# Patient Record
Sex: FEMALE | Race: WHITE | NOT HISPANIC OR LATINO | Employment: OTHER | URBAN - METROPOLITAN AREA
[De-identification: names, ages, dates, MRNs, and addresses within clinical notes are randomized per-mention and may not be internally consistent; named-entity substitution may affect disease eponyms.]

---

## 2020-02-19 ENCOUNTER — TELEPHONE (OUTPATIENT)
Dept: SCHEDULING | Facility: IMAGING CENTER | Age: 60
End: 2020-02-19

## 2020-02-20 ENCOUNTER — OFFICE VISIT (OUTPATIENT)
Dept: URGENT CARE | Facility: CLINIC | Age: 60
End: 2020-02-20
Payer: COMMERCIAL

## 2020-02-20 VITALS
WEIGHT: 221 LBS | HEIGHT: 67 IN | HEART RATE: 74 BPM | RESPIRATION RATE: 16 BRPM | OXYGEN SATURATION: 95 % | BODY MASS INDEX: 34.69 KG/M2 | DIASTOLIC BLOOD PRESSURE: 84 MMHG | TEMPERATURE: 98 F | SYSTOLIC BLOOD PRESSURE: 118 MMHG

## 2020-02-20 DIAGNOSIS — J40 BRONCHITIS: ICD-10-CM

## 2020-02-20 DIAGNOSIS — Z00.00 HEALTHCARE MAINTENANCE: ICD-10-CM

## 2020-02-20 DIAGNOSIS — R05.9 COUGH: ICD-10-CM

## 2020-02-20 PROCEDURE — 99204 OFFICE O/P NEW MOD 45 MIN: CPT | Performed by: NURSE PRACTITIONER

## 2020-02-20 RX ORDER — ALBUTEROL SULFATE 90 UG/1
2 AEROSOL, METERED RESPIRATORY (INHALATION) EVERY 4 HOURS PRN
Qty: 1 INHALER | Refills: 0 | Status: SHIPPED | OUTPATIENT
Start: 2020-02-20 | End: 2020-03-05

## 2020-02-20 RX ORDER — PREDNISONE 20 MG/1
40 TABLET ORAL DAILY
Qty: 10 TAB | Refills: 0 | Status: SHIPPED | OUTPATIENT
Start: 2020-02-20 | End: 2020-02-25

## 2020-02-20 RX ORDER — DEXTROMETHORPHAN HYDROBROMIDE AND PROMETHAZINE HYDROCHLORIDE 15; 6.25 MG/5ML; MG/5ML
5 SYRUP ORAL EVERY 4 HOURS PRN
Qty: 100 ML | Refills: 0 | Status: SHIPPED | OUTPATIENT
Start: 2020-02-20 | End: 2020-02-27

## 2020-02-20 ASSESSMENT — ENCOUNTER SYMPTOMS
ABDOMINAL PAIN: 0
NAUSEA: 0
COUGH: 1
SORE THROAT: 0
BLURRED VISION: 0
PALPITATIONS: 0
VOMITING: 0
DOUBLE VISION: 0
FEVER: 0
MYALGIAS: 0
DIARRHEA: 0
SPUTUM PRODUCTION: 1
SHORTNESS OF BREATH: 0
SINUS PAIN: 0
HEADACHES: 0
CHILLS: 0
NECK PAIN: 0

## 2020-02-20 ASSESSMENT — COPD QUESTIONNAIRES: COPD: 0

## 2020-02-21 NOTE — PROGRESS NOTES
Subjective:     Gabriela Mosquera is a 59 y.o. female who presents for Cough (x 5 days, little productive cough and wheezing)      Cough   This is a new problem. The current episode started in the past 7 days (4 days ago). The problem has been unchanged. The problem occurs constantly. The cough is non-productive. Associated symptoms include nasal congestion. Pertinent negatives include no chest pain, chills, ear pain, fever, headaches, myalgias, sore throat or shortness of breath. Nothing aggravates the symptoms. She has tried nothing for the symptoms. There is no history of asthma, bronchitis, COPD, emphysema or pneumonia.         Review of Systems   Constitutional: Positive for malaise/fatigue. Negative for chills and fever.   HENT: Positive for congestion. Negative for ear pain, sinus pain and sore throat.         Sore throat has resolved.   Eyes: Negative for blurred vision and double vision.   Respiratory: Positive for cough and sputum production. Negative for shortness of breath.    Cardiovascular: Negative for chest pain and palpitations.   Gastrointestinal: Negative for abdominal pain, diarrhea, nausea and vomiting.   Genitourinary: Negative for dysuria and urgency.   Musculoskeletal: Negative for myalgias and neck pain.   Neurological: Negative for headaches.   All other systems reviewed and are negative.      PMH: History reviewed. No pertinent past medical history.  ALLERGIES: No Known Allergies  SURGHX: History reviewed. No pertinent surgical history.  SOCHX:   Social History     Socioeconomic History   • Marital status:      Spouse name: Not on file   • Number of children: Not on file   • Years of education: Not on file   • Highest education level: Not on file   Occupational History   • Not on file   Social Needs   • Financial resource strain: Not on file   • Food insecurity     Worry: Not on file     Inability: Not on file   • Transportation needs     Medical: Not on file     Non-medical: Not on  "file   Tobacco Use   • Smoking status: Never Smoker   • Smokeless tobacco: Never Used   Substance and Sexual Activity   • Alcohol use: Not on file   • Drug use: Not on file   • Sexual activity: Not on file   Lifestyle   • Physical activity     Days per week: Not on file     Minutes per session: Not on file   • Stress: Not on file   Relationships   • Social connections     Talks on phone: Not on file     Gets together: Not on file     Attends [employer]ism service: Not on file     Active member of club or organization: Not on file     Attends meetings of clubs or organizations: Not on file     Relationship status: Not on file   • Intimate partner violence     Fear of current or ex partner: Not on file     Emotionally abused: Not on file     Physically abused: Not on file     Forced sexual activity: Not on file   Other Topics Concern   • Not on file   Social History Narrative   • Not on file     FH: History reviewed. No pertinent family history.      Objective:   /84 (BP Location: Left arm, Patient Position: Sitting, BP Cuff Size: Adult)   Pulse 74   Temp 36.7 °C (98 °F) (Temporal)   Resp 16   Ht 1.689 m (5' 6.5\")   Wt 100.2 kg (221 lb)   SpO2 95%   BMI 35.14 kg/m²     Physical Exam  Vitals signs and nursing note reviewed.   Constitutional:       General: She is not in acute distress.     Appearance: She is ill-appearing.   HENT:      Head: Normocephalic and atraumatic.      Right Ear: Tympanic membrane normal.      Left Ear: Tympanic membrane normal.      Nose: Congestion and rhinorrhea present.      Mouth/Throat:      Mouth: Mucous membranes are moist.      Pharynx: Posterior oropharyngeal erythema present. No oropharyngeal exudate.      Comments: Positive for hoarse voice  Eyes:      Extraocular Movements: Extraocular movements intact.      Conjunctiva/sclera: Conjunctivae normal.      Pupils: Pupils are equal, round, and reactive to light.   Neck:      Musculoskeletal: No neck rigidity or muscular " tenderness.   Cardiovascular:      Rate and Rhythm: Normal rate and regular rhythm.      Pulses: Normal pulses.      Heart sounds: Normal heart sounds.   Pulmonary:      Effort: Pulmonary effort is normal.      Breath sounds: Decreased air movement present. Examination of the right-lower field reveals decreased breath sounds. Examination of the left-lower field reveals decreased breath sounds. Decreased breath sounds present.   Chest:      Chest wall: No tenderness.   Abdominal:      General: Abdomen is flat. There is no distension.      Palpations: Abdomen is soft.      Tenderness: There is no abdominal tenderness.   Musculoskeletal: Normal range of motion.   Lymphadenopathy:      Cervical: Cervical adenopathy present.   Skin:     General: Skin is warm and dry.      Capillary Refill: Capillary refill takes less than 2 seconds.   Neurological:      General: No focal deficit present.      Mental Status: She is alert and oriented to person, place, and time. Mental status is at baseline.   Psychiatric:         Mood and Affect: Mood normal.         Behavior: Behavior normal.         Judgment: Judgment normal.         Assessment/Plan:   Assessment    1. Cough  promethazine-dextromethorphan (PROMETHAZINE-DM) 6.25-15 MG/5ML syrup    albuterol 108 (90 Base) MCG/ACT Aero Soln inhalation aerosol   2. Bronchitis  promethazine-dextromethorphan (PROMETHAZINE-DM) 6.25-15 MG/5ML syrup    albuterol 108 (90 Base) MCG/ACT Aero Soln inhalation aerosol    predniSONE (DELTASONE) 20 MG Tab   3. Healthcare maintenance  REFERRAL TO FOLLOW-UP WITH PRIMARY CARE    REFERRAL TO DERMATOLOGY     We discussed supportive measures including humidifier, warm salt water gargles, over-the-counter Cepacol throat lozenges, rest  and increased fluids.  She was encouraged to seek treatment back in the ER or urgent care for worsening symptoms,  fever greater than 100.5, wheezes or shortness of breath.    AVS handout given and reviewed with patient. Pt  educated on red flags and when to seek treatment back in ER or UC.

## 2020-02-21 NOTE — PATIENT INSTRUCTIONS
Acute Bronchitis, Adult  Acute bronchitis is when air tubes (bronchi) in the lungs suddenly get swollen. The condition can make it hard to breathe. It can also cause these symptoms:  · A cough.  · Coughing up clear, yellow, or green mucus.  · Wheezing.  · Chest congestion.  · Shortness of breath.  · A fever.  · Body aches.  · Chills.  · A sore throat.  Follow these instructions at home:  Medicines  · Take over-the-counter and prescription medicines only as told by your doctor.  · If you were prescribed an antibiotic medicine, take it as told by your doctor. Do not stop taking the antibiotic even if you start to feel better.  General instructions  · Rest.  · Drink enough fluids to keep your pee (urine) clear or pale yellow.  · Avoid smoking and secondhand smoke. If you smoke and you need help quitting, ask your doctor. Quitting will help your lungs heal faster.  · Use an inhaler, cool mist vaporizer, or humidifier as told by your doctor.  · Keep all follow-up visits as told by your doctor. This is important.  How is this prevented?  To lower your risk of getting this condition again:  · Wash your hands often with soap and water. If you cannot use soap and water, use hand .  · Avoid contact with people who have cold symptoms.  · Try not to touch your hands to your mouth, nose, or eyes.  · Make sure to get the flu shot every year.  Contact a doctor if:  · Your symptoms do not get better in 2 weeks.  Get help right away if:  · You cough up blood.  · You have chest pain.  · You have very bad shortness of breath.  · You become dehydrated.  · You faint (pass out) or keep feeling like you are going to pass out.  · You keep throwing up (vomiting).  · You have a very bad headache.  · Your fever or chills gets worse.  This information is not intended to replace advice given to you by your health care provider. Make sure you discuss any questions you have with your health care provider.  Document Released: 06/05/2009  Document Revised: 07/26/2017 Document Reviewed: 06/07/2017  [employer] Interactive Patient Education © 2017 [employer]

## 2020-03-20 ENCOUNTER — TELEPHONE (OUTPATIENT)
Dept: HEALTH INFORMATION MANAGEMENT | Facility: OTHER | Age: 60
End: 2020-03-20

## 2020-03-20 NOTE — TELEPHONE ENCOUNTER
1. Caller Name:Gabriela Boynton                          Call Back Number: [phone]    [facility] PCP or Specialty Provider: No            2.  Does patient have any active symptoms of respiratory illness (fever OR cough OR shortness of breath)? Yes, the patient reports the following respiratory symptoms: cough. Dx'd w/ bronchitis 3 weeks ago was on steriods and inhaler for    3.  Does patient have any comoribidities? None     4.  In the last 30 days, has the patient traveled outside of the country OR in a high risk area within the [place] OR have any known contact with someone who has or is suspected to have COVID-19?  Yes, [place]    5. Disposition: Cleared by RN Triage; OK to keep/schedule appointment    Note routed to PCP: [facility] only.

## 2020-03-21 ENCOUNTER — HOSPITAL ENCOUNTER (EMERGENCY)
Facility: MEDICAL CENTER | Age: 60
End: 2020-03-21
Attending: EMERGENCY MEDICINE
Payer: COMMERCIAL

## 2020-03-21 ENCOUNTER — APPOINTMENT (OUTPATIENT)
Dept: RADIOLOGY | Facility: MEDICAL CENTER | Age: 60
End: 2020-03-21
Attending: EMERGENCY MEDICINE
Payer: COMMERCIAL

## 2020-03-21 VITALS
RESPIRATION RATE: 19 BRPM | HEART RATE: 61 BPM | WEIGHT: 210 LBS | SYSTOLIC BLOOD PRESSURE: 160 MMHG | OXYGEN SATURATION: 96 % | BODY MASS INDEX: 33.75 KG/M2 | HEIGHT: 66 IN | DIASTOLIC BLOOD PRESSURE: 79 MMHG | TEMPERATURE: 97.8 F

## 2020-03-21 DIAGNOSIS — S82.851A CLOSED TRIMALLEOLAR FRACTURE OF RIGHT ANKLE, INITIAL ENCOUNTER: ICD-10-CM

## 2020-03-21 PROCEDURE — 96375 TX/PRO/DX INJ NEW DRUG ADDON: CPT

## 2020-03-21 PROCEDURE — 96374 THER/PROPH/DIAG INJ IV PUSH: CPT

## 2020-03-21 PROCEDURE — 73610 X-RAY EXAM OF ANKLE: CPT | Mod: RT

## 2020-03-21 PROCEDURE — 27840 TREAT ANKLE DISLOCATION: CPT

## 2020-03-21 PROCEDURE — 99285 EMERGENCY DEPT VISIT HI MDM: CPT

## 2020-03-21 PROCEDURE — 700111 HCHG RX REV CODE 636 W/ 250 OVERRIDE (IP): Performed by: EMERGENCY MEDICINE

## 2020-03-21 PROCEDURE — 73600 X-RAY EXAM OF ANKLE: CPT | Mod: RT

## 2020-03-21 PROCEDURE — 302874 HCHG BANDAGE ACE 2 OR 3""

## 2020-03-21 RX ORDER — HYDROCODONE BITARTRATE AND ACETAMINOPHEN 5; 325 MG/1; MG/1
1-2 TABLET ORAL EVERY 4 HOURS PRN
Qty: 15 TAB | Refills: 0 | Status: SHIPPED | OUTPATIENT
Start: 2020-03-21 | End: 2020-03-24

## 2020-03-21 RX ORDER — MORPHINE SULFATE 4 MG/ML
4 INJECTION, SOLUTION INTRAMUSCULAR; INTRAVENOUS ONCE
Status: COMPLETED | OUTPATIENT
Start: 2020-03-21 | End: 2020-03-21

## 2020-03-21 RX ADMIN — FENTANYL CITRATE 50 MCG: 50 INJECTION INTRAMUSCULAR; INTRAVENOUS at 14:35

## 2020-03-21 RX ADMIN — MORPHINE SULFATE 4 MG: 4 INJECTION INTRAVENOUS at 12:00

## 2020-03-21 RX ADMIN — PROPOFOL 90 MG: 10 INJECTION, EMULSION INTRAVENOUS at 13:20

## 2020-03-21 NOTE — ED NOTES
Pt discharged to home. Pt was given follow up instructions and prescriptions for norco. Pt verbalized understanding of all instructions for discharge and is wheeled out of the ED with her  driving home.

## 2020-03-21 NOTE — ED TRIAGE NOTES
58 y/o female bib ambulance after she was hiking and rolled her ankle while stepping on mud. + swelling/pain to the right ankle, cms intact. Pt was given 50 mcg fentanyl pta.

## 2020-03-21 NOTE — ED PROVIDER NOTES
"      ED Provider Note    Scribed for Merced Dai M.D. by Lizzy Tapia. 3/21/2020, 11:37 AM.    Primary Care Provider: Pcp Not In Computer  Means of arrival: EMS  History obtained from: Patient  History limited by: None    CHIEF COMPLAINT  Chief Complaint   Patient presents with   • T-5000 Ankle Injury       HPI  Gabriela Mosquera is a 59 y.o. female who presents to the Emergency Department for ankle injury that occurred prior to arrival.  notes patient was on a hiking rail when she slipped on some mud and rolled her right ankle. She is currently complaining of right ankle pain. Patient is unable to walk or put weight on her foot secondary to pain. She notes she heard a crack when she fell. No alleviating or exacerbating factors noted. She denies any left knee pain or left leg numbness.     REVIEW OF SYSTEMS  Pertinent positives include left ankle pain. Pertinent negatives include no knee pain or left leg numbness. All other systems negative.     PAST MEDICAL HISTORY   has a past medical history of Bell's palsy.    SOCIAL HISTORY  Social History     Tobacco Use   • Smoking status: Never Smoker   • Smokeless tobacco: Never Used   Substance Use Topics   • Alcohol use: Yes     Comment: daily   • Drug use: Never      Social History     Substance and Sexual Activity   Drug Use Never       SURGICAL HISTORY  patient denies any surgical history     CURRENT MEDICATIONS  Home Medications     Reviewed by Jose Daniel Mcfarland R.N. (Registered Nurse) on 03/21/20 at 1115  Med List Status: <None>   Medication Last Dose Status        Patient Alok Taking any Medications                       ALLERGIES  No Known Allergies    PHYSICAL EXAM  VITAL SIGNS: /96   Pulse 97   Temp 36.6 °C (97.8 °F) (Temporal)   Resp 18   Ht 1.676 m (5' 6\")   Wt 95.3 kg (210 lb)   SpO2 96%   BMI 33.89 kg/m²   Constitutional: Alert in no apparent distress. Well apearing  HENT: Normocephalic, Atraumatic, Bilateral external ears normal. " Nose normal.   Eyes:  Conjunctiva normal, non-icteric.   Lungs: Lungs clear to ascultation. Non-labored respirations.   Cardiovascular: Heart regular rate and rhythm.   Skin: Warm, Dry, No erythema, No rash.   Neurologic: Alert, Grossly non-focal.   Psychiatric: Affect normal, Judgment normal, Mood normal, Appears appropriate and not intoxicated.   Extremities: Swollen right ankle with deformity, intact DP pulses and intact capillary refill of the toes.       RADIOLOGY  DX-ANKLE 3+ VIEWS RIGHT   Final Result      Again seen displaced trimalleolar fracture. Alignment however does appear somewhat improved.      DX-ANKLE 3+ VIEWS RIGHT   Final Result      1.  Displaced fractures of the medial and lateral malleolus with anterior and lateral subluxation of the talus with respect to the tibia.      2.  Multiple bone fragments within the surrounding soft tissues.      3.  Soft tissue swelling.      DX-ANKLE 2- VIEWS RIGHT    (Results Pending)     The radiologist's interpretation of all radiological studies have been reviewed by me.    Conscious Sedation Procedure Note    Indication: Ankle dislocation and fracture dislocation    Consent: I have discussed with the patient and/or the patient representative the indication, alternatives, and the possible risks and/or complications of the planned procedure and the anesthesia methods. The patient and/or patient representative appear to understand and agree to proceed.    Physician Involvement: The attending physician was present and supervising this procedure.    Pre-Sedation Documentation and Exam: I have personally completed a history, physical exam & review of systems for this patient (see notes).    Airway Assessment: normal    Prior History of Anesthesia Complications: none    ASA Classification: Class 1 - A normal healthy patient    Sedation/ Anesthesia Plan: intravenous sedation    Medications Used: propofol intravenously    Monitoring and Safety: The patient was placed on  a cardiac monitor and vital signs, pulse oximetry and level of consciousness were continuously evaluated throughout the procedure. The patient was closely monitored until recovery from the medications was complete and the patient had returned to baseline status. Respiratory therapy was on standby at all times during the procedure.      (The following sections must be completed)  Post-Sedation Vital Signs: Vital signs were reviewed and were stable after the procedure (see flow sheet for vitals)            Intraservice Time: 10 minutes    Post-Sedation Exam: Lungs: clear and Cardiovascular: normal           Complications: none    I provided both the sedation and procedure, a nurse was present at the bedside for the entire procedure.       Joint Reduction Procedure Note    Indication: fracture    Consent: The patient provided verbal consent for this procedure.    Procedure: The pre-reduction exam showed distal perfusion & neurologic function to be normal. The patient was placed in the appropriate position. Anesthesia/pain controlled with conscious sedation. Reduction of the right ankle was performed by direct traction. Post reduction films were obtained and revealed satisfactory reduction. A post-reduction exam revealed distal perfusion & neurologic function to be normal. The affected area was immobilized with a posterior ankle and sugar tong splint and crutches were provided for ambulatory support.    The patient tolerated the procedure well.    Complications: None      COURSE & MEDICAL DECISION MAKING  Pertinent Labs & Imaging studies reviewed. (See chart for details)    11:37 AM - Patient seen and examined at bedside. Patient will be treated with morphine 4 mg/mL. Ordered DX-ankle to evaluate her symptoms. Reviewed x-ray at bedside and explained images to patient. Informed her that she definitely has an ankle fracture, however, it is unclear whether or not she will need surgical intervention. Informed her that I  will consult with orthopedics. She is comfortable with this plan.     12:10 PM Paged orthopedics.      12:18 PM - I discussed the patient's case and the above findings with Dr. Damon (Orthopedics) who recommended splinting patient's ankle and having her follow up outpatient.     12:19 PM - Patient reevaluated at bedside. Discussed conversation with orthopedics with patient and informed her that they recommended she be splinted and follow up out patient. Given radiology images, discussed performing a conscious sedation to properly set foot.     1:15 PM - Conscious sedation and joint reduction procedures performed at this time without difficulty, follow up radiology will be ordered.     2:23 PM Paged orthopedics.     2:28 PM - I discussed repeat radiology results with Dr. Damon (Orthopedic) who discussed attempting to re-set the foot to improve stability before discharging patient home.     3:37 PM  Repeat imaging was performed which showed some improvement of the lateral/medial alignment.  Patient will be discharged with Ortho follow-up as an outpatient.    Advised patient to minimize any movement or weight bearing of the right ankle until she is evaluated outpatient. She is comfortable with this plan and will return for any new or worsening symptoms.       The patient will not drink alcohol nor drive with prescribed medications. The patient will return for new or worsening symptoms and is stable at the time of discharge. Patient was given return precautions. Patient and/or family member verbalizes understanding and will comply.    In prescribing controlled substances to this patient, I certify that I have obtained and reviewed the medical history of Gabriela Mosquera. I have also made a good khoa effort to obtain applicable records from other providers who have treated the patient and records did not demonstrate any increased risk of substance abuse that would prevent me from prescribing controlled substances.      I have conducted a physical exam and documented it. I have reviewed Ms. Mosquera’s prescription history as maintained by the [place] Prescription Monitoring Program.     I have assessed the patient’s risk for abuse, dependency, and addiction using the validated Opioid Risk Tool available at https://www.mdcalc.com/gdakmo-kjcb-gmhv-ort-narcotic-abuse.     Given the above, I believe the benefits of controlled substance therapy outweigh the risks. The reasons for prescribing controlled substances include non-narcotic, oral analgesic alternatives have been inadequate for pain control. Accordingly, I have discussed the risk and benefits, treatment plan, and alternative therapies with the patient.     DISPOSITION:  Patient will be discharged home in stable condition.    FOLLOW UP:  Fitz Damon M.D.  [address]  [facility] [address] [phone]      Call [facility] Monday to make appointment with a Foot and Ankle specialist or Trauma orthopedist    [facility], Emergency Dept  [address] [facility] [address] [phone]    Return for worsening pain, swelling or other concerns      OUTPATIENT MEDICATIONS:  New Prescriptions    HYDROCODONE-ACETAMINOPHEN (NORCO) 5-325 MG TAB PER TABLET    Take 1-2 Tabs by mouth every four hours as needed for up to 3 days.         FINAL IMPRESSION  1. Closed trimalleolar fracture of right ankle, initial encounter    2. Conscious sedation procedure performed.   3. Joint reduction procedure performed.       This dictation has been created using voice recognition software and/or scribes. The accuracy of the dictation is limited by the abilities of the software and the expertise of the scribes. I expect there may be some errors of grammar and possibly content. I made every attempt to manually correct the errors within my dictation. However, errors related to voice recognition software and/or scribes may still exist and should be interpreted within the  appropriate context.     I, Lizzy Tapia ([place]), am scribing for, and in the presence of, Merced Dai M.D..    Electronically signed by: Lizzy Tapia ([place]), 3/21/2020    IMerced M.D. personally performed the services described in this documentation, as scribed by Lizzy Tapia in my presence, and it is both accurate and complete. c    The note accurately reflects work and decisions made by me.  Merced Dai M.D.  3/21/2020  3:37 PM

## 2020-04-14 ENCOUNTER — TELEMEDICINE (OUTPATIENT)
Dept: DERMATOLOGY | Facility: IMAGING CENTER | Age: 60
End: 2020-04-14
Payer: COMMERCIAL

## 2020-04-14 DIAGNOSIS — L30.9 DERMATITIS: ICD-10-CM

## 2020-04-14 DIAGNOSIS — L57.0 ACTINIC KERATOSES: ICD-10-CM

## 2020-04-14 PROCEDURE — 99203 OFFICE O/P NEW LOW 30 MIN: CPT | Mod: 95,CR | Performed by: NURSE PRACTITIONER

## 2020-04-14 RX ORDER — TRIAMCINOLONE ACETONIDE 1 MG/G
1 OINTMENT TOPICAL 2 TIMES DAILY
Qty: 30 G | Refills: 1 | Status: SHIPPED | OUTPATIENT
Start: 2020-04-14 | End: 2020-12-09

## 2020-04-14 ASSESSMENT — ENCOUNTER SYMPTOMS
FEVER: 0
CHILLS: 0

## 2020-04-14 NOTE — PROGRESS NOTES
"Dermatology New Patient Visit    Chief Complaint   Patient presents with   • Establish Care     skin lesions of right lower leg and hands     As a means of avoiding spread of COVID-19, this visit is being conducted by televisit. This televisit was initiated by the patient and they verbally consented.    Subjective:     HPI:   Gabriela Mosquera is a 59 y.o. female presenting for    HPI/location: right shin about mid shin. Red scaly raised area. Broke ankle and was in splint. When splint was removed one week ago this lesion was present. May have been present prior to splinting but not as prominent as now.   Time present: one wekk  Painful lesion: No  Itching lesion: No  Enlarging lesion: No  Anything make it better or worse?    HPI/location: dorsal aspect of bilateral hands-red scaly spots  Time present: \"for a while\"  Painful lesion: No  Itching lesion: No  Enlarging lesion: No  Anything make it better or worse?    States that she has had \"things frozen off on her forehead before\"      Past Medical History:   Diagnosis Date   • Bell's palsy        No current outpatient medications on file prior to visit.     No current facility-administered medications on file prior to visit.        No Known Allergies    History reviewed. No pertinent family history.    Social History     Socioeconomic History   • Marital status:      Spouse name: Not on file   • Number of children: Not on file   • Years of education: Not on file   • Highest education level: Not on file   Occupational History   • Not on file   Social Needs   • Financial resource strain: Not on file   • Food insecurity     Worry: Not on file     Inability: Not on file   • Transportation needs     Medical: Not on file     Non-medical: Not on file   Tobacco Use   • Smoking status: Never Smoker   • Smokeless tobacco: Never Used   Substance and Sexual Activity   • Alcohol use: Yes     Comment: daily   • Drug use: Never   • Sexual activity: Not on file   Lifestyle   • " Physical activity     Days per week: Not on file     Minutes per session: Not on file   • Stress: Not on file   Relationships   • Social connections     Talks on phone: Not on file     Gets together: Not on file     Attends Jainism service: Not on file     Active member of club or organization: Not on file     Attends meetings of clubs or organizations: Not on file     Relationship status: Not on file   • Intimate partner violence     Fear of current or ex partner: Not on file     Emotionally abused: Not on file     Physically abused: Not on file     Forced sexual activity: Not on file   Other Topics Concern   • Not on file   Social History Narrative   • Not on file       Review of Systems   Constitutional: Negative for chills and fever.   Skin: Positive for rash. Negative for itching.        Objective:     A focused cutaneous exam was completed via video and photos including: face hands and right shin with the following pertinent findings listed below. Remaining above-listed examined areas within normal limits / negative for rashes or lesions.    There were no vitals taken for this visit.    Physical Exam   Constitutional: She is oriented to person, place, and time and well-developed, well-nourished, and in no distress. No distress.   HENT:   Head: Normocephalic.   Pulmonary/Chest: Effort normal.   Neurological: She is alert and oriented to person, place, and time.   Skin: Rash noted. There is erythema.        Psychiatric: Mood normal.       DATA:     Assessment and Plan:     1. Dermatitis to right lower leg shin (favored dx  -copious moisturizer as much as possible   - Start Triamcinolone 0.1% ointment BID to affected areas of the right shinuntil the skin is smooth  - We reviewed risks/benefits/expectations of treatment in detail, including side effects of long term topical steroid use (such as striae, atrophy and telangiectasias).   -re-check in 6 weeks. Possible biopsy if not improving.     - triamcinolone  acetonide (KENALOG) 0.1 % Ointment; Apply 1 Application to affected area(s) 2 times a day.  Dispense: 30 g; Refill: 1    2. Actinic keratoses dorsal aspect of hands  Stable. Plan on cyro at upcoming in person EVER visit   Advised to call right away for any changes to lesions   Pt verbalized understanding and agrees to plan of care      Followup: Return in about 6 weeks (around 5/26/2020) for spot check and EVER .    ARAM Granados.         Render Post-Care Instructions In Note?: no Medical Necessity Clause: This procedure was medically necessary because the lesions that were treated were: Detail Level: Detailed Medical Necessity Information: It is in your best interest to select a reason for this procedure from the list below. All of these items fulfill various CMS LCD requirements except the new and changing color options.

## 2020-04-15 ENCOUNTER — PHYSICAL THERAPY (OUTPATIENT)
Dept: PHYSICAL THERAPY | Facility: REHABILITATION | Age: 60
End: 2020-04-15
Attending: ORTHOPAEDIC SURGERY
Payer: COMMERCIAL

## 2020-04-15 DIAGNOSIS — S82.851A DISPLACED TRIMALLEOLAR FRACTURE OF RIGHT LOWER LEG, INITIAL ENCOUNTER FOR CLOSED FRACTURE: ICD-10-CM

## 2020-04-15 PROCEDURE — 97161 PT EVAL LOW COMPLEX 20 MIN: CPT

## 2020-04-15 PROCEDURE — 97110 THERAPEUTIC EXERCISES: CPT

## 2020-04-15 ASSESSMENT — ENCOUNTER SYMPTOMS
PAIN SCALE: 2
PAIN LOCATION: R ANKLE
QUALITY: DULL ACHE
PAIN TIMING: INTERMITTENT

## 2020-04-15 NOTE — OP THERAPY EVALUATION
Outpatient Physical Therapy  INITIAL EVALUATION    [facility] [address]  [address]  [place] [address]  Phone:  [phone]  Fax:  [phone]    Date of Evaluation: 04/15/2020    Patient: Gabriela Mosquera  YOB: 1960  MRN: [id number]     Referring Provider: Aly Gamino M.D.  [address] [place] [address]   Referring Diagnosis Displaced trimalleolar fracture of right lower leg, initial encounter for closed fracture [S82.851A]     Time Calculation  Start time: 0903  Stop time: 0945 Time Calculation (min): 42 minutes       Physical Therapy Occurrence Codes    Date of onset of impairment:  3/21/20   Date physical therapy care plan established or reviewed:  4/15/20   Date physical therapy treatment started:  4/15/20          Chief Complaint: Ankle Injury    Visit Diagnoses     ICD-10-CM   1. Displaced trimalleolar fracture of right lower leg, initial encounter for closed fracture S82.851A         Subjective:   History of Present Illness:     Date of onset:  3/25/2020    Mechanism of injury:  3/21 twisted ankle when stepping off curb onto slippery surface.  Went to ER, XRay showed displaced trimalleolar fracture) and ankle was splinted.  Referred to orthopedics.   R ankle ORIF on 3/25.  NWB R ankle since injury.  Wore splint following ORIF for 2 weeks.  Has been in walking boot for one week now, but still NWB.  Ortho told her it is ok to not wear boot if just sitting at home.  Using knee scooter for mobility, independent.  Pt reports no difficulty maneuvering in home, no steps/stairs at home.  Next follow up with ortho on 5/5.  Pt hoping she will be cleared to weight bear at that visit.  Pt reports minimal occasional pain in ankle and calf.  Intermittent numbness in toes.  Still swollen, but not as much as initially.  Pt reports she is doing exercises that were given to her by ortho (Towel calf stretch,  sock, alphabet) 2x/day for the past week since  transitioning to boot.   Pt works part time, standing on feet.  Only 2 hours, a couple times a week.    Pt enjoys hiking, work out, yoga, but unable to do any of that since ankle injury.  Also unable to drive.  Pain:     Current pain ratin    Location:  R ankle    Quality:  Dull ache    Pain timing:  Intermittent    Progression:  Improving  Diagnostic Tests:     X-ray: abnormal      Diagnostic Tests Comments:  Trimalleolar fracture with some lateral displacement of the talus with respect to the tibia.  Treatments:     Previous treatment:  Immobilization    Current treatment:  Home exercise program, CAM boot and activity modification  Patient Goals:     Patient goals for therapy:  Decreased edema, decreased pain, increased strength, increased motion, return to work and return to sport/leisure activities    Other patient goals:  Be able to hike and drive, to walk perfectly      Past Medical History:   Diagnosis Date   • Bell's palsy      History reviewed. No pertinent surgical history.  Social History     Tobacco Use   • Smoking status: Never Smoker   • Smokeless tobacco: Never Used   Substance Use Topics   • Alcohol use: Yes     Comment: daily     Family and Occupational History     Socioeconomic History   • Marital status:      Spouse name: Not on file   • Number of children: Not on file   • Years of education: Not on file   • Highest education level: Not on file   Occupational History   • Not on file       Objective     Observations     Right Ankle/Foot   Positive for edema, incision and trophic changes.     Additional Observation Details  Incision at medial and lateral joint line.    Neurological Testing     Additional Neurological Details  Pt reports numbness in toes intermittent.    Tenderness     Right Ankle/Foot   Tenderness in the anterior ankle, anterior talofibular ligament, lateral malleolus and medial malleolus.     Active Range of Motion   Left Ankle/Foot   Normal active range of  motion    Right Ankle/Foot   Dorsiflexion (ke): 0 degrees   Dorsiflexion (kf): -8 degrees   Plantar flexion: 33 degrees   Inversion: 8 degrees   Eversion: 6 degrees   Great toe flexion: WFL  Great toe extension: WFL  Lesser toes: WFL    Joint Play     Right Ankle/Foot  Joints within functional limits are the proximal tibiofibular joint and distal tibiofibular joint. Hypomobile in the talocrural joint, subtalar joint, midfoot and forefoot.     Strength:      Right Ankle/Foot   Dorsiflexion: 4  Plantar flexion: 4  Inversion: 3 (pain)  Eversion: 4  Great toe flexion: 4  Great toe extension: 4    Swelling     Right Ankle/Foot   Metatarsal heads: 24.7 cm  Figure 8: 56 cm        Therapeutic Exercises (CPT 29163):     1. Seated heel slide    2. Seated windshield wipers    3. Towel scrunch    4. Arch lift    5. SLR    6. Sidelying hip abd    7. Standing hip circles    8. LAQ    9. Standing HS curls    10. Active ankle PF    11. Active ankle DF      Therapeutic Exercise Summary: Pt performed these exercises with instruction and SPV.  Provided handout with these exercises for daily HEP.      Time-based treatments/modalities:  Therapeutic exercise minutes (CPT 86429): 15 minutes       Assessment, Response and Plan:   Impairments: abnormal or restricted ROM, impaired functional mobility, impaired physical strength, lacks appropriate home exercise program, swelling and weight-bearing intolerance    Assessment details:  59 y.o. female presents s/p R ankle ORIF following trimalleolar fracture.  Pt demonstrates limited R ankle AROM and strength.  Pt will benefit from skilled PT services to increase AROM, progress wb'ing as appropriate per surgeon's post-op protocol, and provide HEP for strength and stability in weight bearing.  Barriers to therapy:  Out-of-pocket cost of treatment  Prognosis: good    Goals:   Short Term Goals:   1. R ankle DF AROM= 5 deg.  2. R ankle MMTs >4/5 throughout.  3. Pt will be independent with daily  HEP.  Short term goal time span:  2-4 weeks      Long Term Goals:    1. Pt able to ambulate without AD in community, independently.  2. Pt able to drive.  3. Pt will report increased function via improved scores on LEFS.  Long term goal time span:  2-4 months    Plan:   Therapy options:  Physical therapy treatment to continue  Planned therapy interventions:  Gait Training (CPT 62695), Manual Therapy (CPT 82511), Neuromuscular Re-education (CPT 82062), Therapeutic Activities (CPT 02446) and Therapeutic Exercise (CPT 25841)  Frequency:  1x week  Duration in weeks:  12  Discussed with:  Patient  Plan details:  Initially, during NWB phase, pt requests less frequent appointments due to cost of visits.  Pt to continue HEP at home for AROM.  When cleared for strengthening and weight bearing, may increase frequency of visits.      Functional Assessment Used  Lower Extremity Functional Scale Total: 53.75     Referring provider co-signature:  I have reviewed this plan of care and my co-signature certifies the need for services.  Certification Dates:   From 04/15/20   To 07/08/20    Physician Signature: ________________________________ Date: ______________

## 2020-04-24 ENCOUNTER — PHYSICAL THERAPY (OUTPATIENT)
Dept: PHYSICAL THERAPY | Facility: REHABILITATION | Age: 60
End: 2020-04-24
Attending: ORTHOPAEDIC SURGERY
Payer: COMMERCIAL

## 2020-04-24 DIAGNOSIS — S82.851A DISPLACED TRIMALLEOLAR FRACTURE OF RIGHT LOWER LEG, INITIAL ENCOUNTER FOR CLOSED FRACTURE: ICD-10-CM

## 2020-04-24 PROCEDURE — 97140 MANUAL THERAPY 1/> REGIONS: CPT

## 2020-04-24 NOTE — OP THERAPY DAILY TREATMENT
Outpatient Physical Therapy  DAILY TREATMENT     [facility] [address].  [address]  [place] [address]  Phone:  [phone]  Fax:  [phone]    Date: 04/24/2020    Patient: Gabriela Mosquera  YOB: 1960  MRN: [id number]     Time Calculation  Start time: 0915  Stop time: 0950 Time Calculation (min): 35 minutes       Chief Complaint: Ankle Injury    Visit #: 2    SUBJECTIVE:  Pt reports ankle is getting better.  More ROM and less swelling, although still more swollen than she expected.  States the swelling increases through the day even though she has it propped up intermittently throughout the day.    OBJECTIVE:  Current objective measures: R ankle AROM: DF with knee ext= -4, DF with knee flexed -8, add= 18, abd= 5,  Swelling measurements: figure 8= 56.5 cm, metatarsal heads= 25.4 cm.         Therapeutic Exercises (CPT 36530):     1. Resisted ankle 4-way with light resistance band    2. Ankle circles    3. Ankle alphabet    4. Towel calf stretch    5. Active toe flexion and extension      Therapeutic Exercise Summary: Pt performed these exercises with instruction and SPV.  Provided handout with these exercises for daily HEP.  Discussed edema management.  Pt does not wear the walking boot around the house.  Discussed that it will provide some compression, which may assist with decreased edema.  Also, elevating the leg above the level of the heart for edema management, and AROM exercises.    Therapeutic Treatments and Modalities:     1. Manual Therapy (CPT 80905), STM calf, talocural distraction and post mobs Gr III, passive ROM ankle 4 way    Time-based treatments/modalities:  Manual therapy minutes (CPT 83620): 25 minutes  Therapeutic exercise minutes (CPT 37555): 5 minutes       ASSESSMENT:   Response to treatment: Pt demo improved ROM and sherry for PROM and pressure.    PLAN/RECOMMENDATIONS:   Plan for treatment: therapy treatment to continue next visit.  Pt to return  following ortho follow up on 5/5.  Planned interventions for next visit: continue with current treatment.

## 2020-05-06 ENCOUNTER — PHYSICAL THERAPY (OUTPATIENT)
Dept: PHYSICAL THERAPY | Facility: REHABILITATION | Age: 60
End: 2020-05-06
Attending: ORTHOPAEDIC SURGERY
Payer: COMMERCIAL

## 2020-05-06 DIAGNOSIS — S82.851A DISPLACED TRIMALLEOLAR FRACTURE OF RIGHT LOWER LEG, INITIAL ENCOUNTER FOR CLOSED FRACTURE: ICD-10-CM

## 2020-05-06 PROCEDURE — 97110 THERAPEUTIC EXERCISES: CPT

## 2020-05-06 PROCEDURE — 97140 MANUAL THERAPY 1/> REGIONS: CPT

## 2020-05-06 NOTE — OP THERAPY DAILY TREATMENT
Outpatient Physical Therapy  DAILY TREATMENT     [facility] [address].  [address]  [place] [address]  Phone:  [phone]  Fax:  [phone]    Date: 05/06/2020    Patient: Gabriela Mosquera  YOB: 1960  MRN: [id number]     Time Calculation  Start time: 0915  Stop time: 0957 Time Calculation (min): 42 minutes       Chief Complaint: Ankle Injury    Visit #: 3    SUBJECTIVE:  Pt had follow up visit with ortho yesterday, cleared for weight bearing in the boot for 6 weeks.  States she was walking in the boot instead of using the knee scooter most of the day yesterday. This morning, ankle is more swollen than usual.  Reports walking in boot is not painful.    OBJECTIVE:  Current objective measures: R ankle AROM: DF lacks 5, PF= 45, add= 15, abd= 15.  MMTs: PF, DF, and abd= 5/5, add= 5-/5 with mild pain.          Therapeutic Exercises (CPT 80217):     1. Diagonals R ankle, x10 all    2. Standing march, x10    3. AP weight shift in stride stance, x10B    4. Towel calf stretch, 2x 30 sec    5. Standing hip x3, x10B with min UE support prn    6. Gait, 300feet    7. STS, x6    8. Seated clams wtih med-light resistance band, x15    9. Seated HS curls with med-light resistance band, x15B    10. Gait with tape for midline, 3x 10 feet forward and backward    11. Lateral gait on tape, 3x 10 feet B      Therapeutic Exercise Summary: All standing/weightbearing exercises performed with R walking boot.  Add standing hip x3, standing march, AP weight shift in stride stance, and STS to HEP, handout provided.    Therapeutic Treatments and Modalities:     1. Manual Therapy (CPT 09215), talocural distraction and post mobs Gr III, passive ROM ankle 4 way, STM calf and scar mobs.    Time-based treatments/modalities:  Manual therapy minutes (CPT 09088): 15 minutes  Therapeutic exercise minutes (CPT 48430): 30 minutes       ASSESSMENT:   Response to treatment: Pt demo fair gait with walking boot  and sherry weight bearing activity/exercise in boot.    PLAN/RECOMMENDATIONS:   Plan for treatment: therapy treatment to continue next visit.  Planned interventions for next visit: continue with current treatment.

## 2020-10-23 ENCOUNTER — TELEPHONE (OUTPATIENT)
Dept: SCHEDULING | Facility: IMAGING CENTER | Age: 60
End: 2020-10-23

## 2020-12-07 SDOH — ECONOMIC STABILITY: HOUSING INSECURITY
IN THE LAST 12 MONTHS, WAS THERE A TIME WHEN YOU DID NOT HAVE A STEADY PLACE TO SLEEP OR SLEPT IN A SHELTER (INCLUDING NOW)?: NO

## 2020-12-07 SDOH — ECONOMIC STABILITY: HOUSING INSECURITY
IN THE LAST 12 MONTHS, WAS THERE A TIME WHEN YOU DID NOT HAVE A STEADY PLACE TO SLEEP OR SLEPT IN A SHELTER (INCLUDING NOW)?: YES

## 2020-12-07 SDOH — ECONOMIC STABILITY: HOUSING INSECURITY: IN THE LAST 12 MONTHS, HOW MANY PLACES HAVE YOU LIVED?: 1

## 2020-12-07 SDOH — ECONOMIC STABILITY: INCOME INSECURITY: IN THE LAST 12 MONTHS, WAS THERE A TIME WHEN YOU WERE NOT ABLE TO PAY THE MORTGAGE OR RENT ON TIME?: YES

## 2020-12-07 SDOH — HEALTH STABILITY: PHYSICAL HEALTH: ON AVERAGE, HOW MANY DAYS PER WEEK DO YOU ENGAGE IN MODERATE TO STRENUOUS EXERCISE (LIKE A BRISK WALK)?: 3 DAYS

## 2020-12-07 SDOH — HEALTH STABILITY: PHYSICAL HEALTH: ON AVERAGE, HOW MANY MINUTES DO YOU ENGAGE IN EXERCISE AT THIS LEVEL?: 60 MINUTES

## 2020-12-07 SDOH — ECONOMIC STABILITY: TRANSPORTATION INSECURITY
IN THE PAST 12 MONTHS, HAS LACK OF RELIABLE TRANSPORTATION KEPT YOU FROM MEDICAL APPOINTMENTS, MEETINGS, WORK OR FROM GETTING THINGS NEEDED FOR DAILY LIVING?: NO

## 2020-12-07 SDOH — ECONOMIC STABILITY: TRANSPORTATION INSECURITY
IN THE PAST 12 MONTHS, HAS THE LACK OF TRANSPORTATION KEPT YOU FROM MEDICAL APPOINTMENTS OR FROM GETTING MEDICATIONS?: NO

## 2020-12-07 SDOH — HEALTH STABILITY: MENTAL HEALTH
STRESS IS WHEN SOMEONE FEELS TENSE, NERVOUS, ANXIOUS, OR CAN'T SLEEP AT NIGHT BECAUSE THEIR MIND IS TROUBLED. HOW STRESSED ARE YOU?: RATHER MUCH

## 2020-12-07 ASSESSMENT — SOCIAL DETERMINANTS OF HEALTH (SDOH)
WITHIN THE PAST 12 MONTHS, THE FOOD YOU BOUGHT JUST DIDN'T LAST AND YOU DIDN'T HAVE MONEY TO GET MORE: NEVER TRUE
WITHIN THE PAST 12 MONTHS, YOU WORRIED THAT YOUR FOOD WOULD RUN OUT BEFORE YOU GOT THE MONEY TO BUY MORE: NEVER TRUE
HOW OFTEN DO YOU GET TOGETHER WITH FRIENDS OR RELATIVES?: ONCE A WEEK
HOW HARD IS IT FOR YOU TO PAY FOR THE VERY BASICS LIKE FOOD, HOUSING, MEDICAL CARE, AND HEATING?: NOT HARD AT ALL
HOW MANY DRINKS CONTAINING ALCOHOL DO YOU HAVE ON A TYPICAL DAY WHEN YOU ARE DRINKING: 1 OR 2
HOW OFTEN DO YOU HAVE SIX OR MORE DRINKS ON ONE OCCASION: NEVER
DO YOU BELONG TO ANY CLUBS OR ORGANIZATIONS SUCH AS CHURCH GROUPS UNIONS, FRATERNAL OR ATHLETIC GROUPS, OR SCHOOL GROUPS?: YES
HOW OFTEN DO YOU HAVE A DRINK CONTAINING ALCOHOL: 4 OR MORE TIMES A WEEK
IN A TYPICAL WEEK, HOW MANY TIMES DO YOU TALK ON THE PHONE WITH FAMILY, FRIENDS, OR NEIGHBORS?: MORE THAN THREE TIMES A WEEK
HOW OFTEN DO YOU ATTEND CHURCH OR RELIGIOUS SERVICES?: DECLINE

## 2020-12-09 ENCOUNTER — OFFICE VISIT (OUTPATIENT)
Dept: MEDICAL GROUP | Facility: MEDICAL CENTER | Age: 60
End: 2020-12-09
Payer: COMMERCIAL

## 2020-12-09 ENCOUNTER — HOSPITAL ENCOUNTER (OUTPATIENT)
Dept: RADIOLOGY | Facility: MEDICAL CENTER | Age: 60
End: 2020-12-09
Attending: FAMILY MEDICINE
Payer: COMMERCIAL

## 2020-12-09 ENCOUNTER — HOSPITAL ENCOUNTER (OUTPATIENT)
Facility: MEDICAL CENTER | Age: 60
End: 2020-12-09
Attending: FAMILY MEDICINE
Payer: COMMERCIAL

## 2020-12-09 VITALS
WEIGHT: 220.46 LBS | TEMPERATURE: 97.7 F | SYSTOLIC BLOOD PRESSURE: 160 MMHG | DIASTOLIC BLOOD PRESSURE: 92 MMHG | HEIGHT: 66 IN | OXYGEN SATURATION: 98 % | HEART RATE: 70 BPM | BODY MASS INDEX: 35.43 KG/M2 | RESPIRATION RATE: 16 BRPM

## 2020-12-09 DIAGNOSIS — Z12.31 ENCOUNTER FOR SCREENING MAMMOGRAM FOR BREAST CANCER: ICD-10-CM

## 2020-12-09 DIAGNOSIS — R42 DIZZINESS: ICD-10-CM

## 2020-12-09 DIAGNOSIS — R25.1 TREMOR: ICD-10-CM

## 2020-12-09 DIAGNOSIS — E66.9 OBESITY (BMI 30-39.9): ICD-10-CM

## 2020-12-09 DIAGNOSIS — Z12.11 SCREENING FOR COLORECTAL CANCER: ICD-10-CM

## 2020-12-09 DIAGNOSIS — Z12.12 SCREENING FOR COLORECTAL CANCER: ICD-10-CM

## 2020-12-09 DIAGNOSIS — I10 HYPERTENSION, UNSPECIFIED TYPE: ICD-10-CM

## 2020-12-09 DIAGNOSIS — M79.672 LEFT FOOT PAIN: ICD-10-CM

## 2020-12-09 PROCEDURE — 82274 ASSAY TEST FOR BLOOD FECAL: CPT

## 2020-12-09 PROCEDURE — 77067 SCR MAMMO BI INCL CAD: CPT

## 2020-12-09 PROCEDURE — 73620 X-RAY EXAM OF FOOT: CPT | Mod: LT

## 2020-12-09 PROCEDURE — 99204 OFFICE O/P NEW MOD 45 MIN: CPT | Performed by: FAMILY MEDICINE

## 2020-12-09 RX ORDER — AMLODIPINE BESYLATE 5 MG/1
5 TABLET ORAL DAILY
Qty: 30 TAB | Refills: 1 | Status: SHIPPED | OUTPATIENT
Start: 2020-12-09 | End: 2020-12-23 | Stop reason: SDUPTHER

## 2020-12-09 ASSESSMENT — PATIENT HEALTH QUESTIONNAIRE - PHQ9: CLINICAL INTERPRETATION OF PHQ2 SCORE: 0

## 2020-12-09 NOTE — PROGRESS NOTES
Subjective:     CC:  Diagnoses of Dizziness, Tremor, Left foot pain, Obesity (BMI 30-39.9), Hypertension, unspecified type, Encounter for screening mammogram for breast cancer, and Screening for colorectal cancer were pertinent to this visit.    HISTORY OF THE PRESENT ILLNESS: Patient is a 60 y.o. female. This pleasant patient is here today to establish care and discuss dizziness, shaking of her left hand and foot pain. Her prior PCP was [facility] last visit about 4.5 years ago.    Dizziness  Onset: yearly for last few years with last episode starting 2-3 months ago.  Only has symptoms in the Spring and fall and states Occurs daily when turning over in bed.  Has very infrequent symptoms otherwise when she turns quickly.  No symptom when driving.  No vision changes and had vision checked within the last 1.5 years and states her vision was improving.  No fever, chills, +congestion and runny nose around the time symptoms occur.  +intermittent scratchy throat with post nasal drip. No ear pain.  No dizziness with standing up quickly.  No nausea, vomiting or headaches.    Tremor  Onset: a couple months ago.  Only has left hand tremor.  She is right handed.  No clear trigger.  No known worsening factor.  No known improving factors.  Reports good strength and no weakness. She has not tried anything for her symptoms.  No numbness or neck pain.    Left foot pain  Onset: 5 months ago.  She initially thought it was due to compensating for her right ankle fracture, but it continued despite her other ankle improving.  Stats she rolled her ankle a week ago and symptoms improved.  States the pain previously was debilitating and very sharp and worse with walking.  Pain was on the top of her foot.    Obesity (BMI 30-39.9)  Chronic issue.  She is walking regularly 3x/week.  She is not eating a well balanced diet.      Allergies: Patient has no known allergies.    Current Outpatient Medications Ordered in Epic   Medication Sig Dispense  "Refill   • amLODIPine (NORVASC) 5 MG Tab Take 1 Tab by mouth every day. 30 Tab 1     No current Epic-ordered facility-administered medications on file.        Past Medical History:   Diagnosis Date   • Bell's palsy        Past Surgical History:   Procedure Laterality Date   • OPEN REDUCTION Right 03/2020    facture repair of ankle       Social History     Tobacco Use   • Smoking status: Never Smoker   • Smokeless tobacco: Never Used   Substance Use Topics   • Alcohol use: Yes     Frequency: 4 or more times a week     Drinks per session: 1 or 2     Binge frequency: Never     Comment: 5 times a week, wine or liquor   • Drug use: Never       Social History     Social History Narrative   • Not on file       Family History   Problem Relation Age of Onset   • Diabetes Mother    • Hypertension Mother    • Cancer Neg Hx    • Stroke Neg Hx        Health Maintenance: mammogram ordered.  FIT ordered. Plan to discuss vaccines next visit.    ROS:   Gen: no fevers/chills, no changes in weight  Eyes: no changes in vision  ENT: no sore throat, no hearing loss, no bloody nose  Pulm: no sob, no cough  CV: no chest pain, no palpitations  GI: no nausea/vomiting, no diarrhea  : no dysuria  MSk: no myalgias  Skin: no rash  Neuro: no headaches, no numbness/tingling  Heme/Lymph: no easy bruising      Objective:     Exam: /92 (BP Location: Left arm, Patient Position: Sitting, BP Cuff Size: Adult long)   Pulse 70   Temp 36.5 °C (97.7 °F) (Temporal)   Resp 16   Ht 1.676 m (5' 6\")   Wt 100 kg (220 lb 7.4 oz)   SpO2 98%  Body mass index is 35.58 kg/m².    General: Normal appearing. No distress.  HEENT: Normocephalic.  Canals are clear bilaterally, tympanic membranes are benign, nasal mucosa benign, oropharynx is without erythema, edema or exudates.   Eyes: Eyes conjunctiva clear lids without ptosis, pupils equal and reactive to light accommodation, ears normal shape and contour  Neck: Supple. Thyroid is not enlarged.  Pulmonary: " Clear to ausculation.  Normal effort. No rales, ronchi, or wheezing.  Cardiovascular: Regular rate and rhythm without murmur.   Abdomen: Soft, nontender, nondistended. Normal bowel sounds. Liver and spleen are not palpable  Neurologic: No gross motor deficits. CN2-12 grossly intact  Lymph: No cervical or supraclavicular lymph nodes are palpable  Skin: Warm and dry.  No obvious lesions.  Musculoskeletal: Normal gait. No extremity cyanosis, clubbing, or edema. Mild fine resting tremor bilateral hands.   Psych: Normal mood and affect. Alert and oriented x3. Judgment and insight is normal. PHQ 9 = 3      Assessment & Plan:   60 y.o. female with the following -    1. Dizziness  Chronic issue that is seasonal and stable.  PE today was benign except tremor and elevated BP.  Will treat her HTN per below since her dizziness could be BP related.  Will check labs.  Will consider allergy medications if symptoms persist given seasonal nature of symptoms.  Less likely carotid stenosis given seasonal nature but will consider carotid US if symptoms persist.  - HEMOGLOBIN A1C; Future    2. Tremor  New issue, unclear etiology.  Will check labs and closely follow-up.  - CBC WITH DIFFERENTIAL; Future  - Comp Metabolic Panel; Future  - Lipid Profile; Future  - TSH; Future  - FREE THYROXINE; Future    3. Left foot pain  Chronic issue that recently improved when she rolled her ankle.  PE examination today without abnormalities noted or tenderness.  Xray ordered for her to do if symptoms reoccur.  - DX-FOOT-2- LEFT; Future    4. Obesity (BMI 30-39.9)  - Patient identified as having weight management issue.  Appropriate orders and counseling given.  - HEMOGLOBIN A1C; Future    5. Hypertension, unspecified type  New issue with elevated BP noted on exam today as well as 3/21/2020 ER visit.  Will start low dose norvasc (SE profile discussed).  Encouraged low salt diet, avoid caffeine, avoid Nsaids.  Close follow-up for BP recheck. Labs  ordered. Patient expressed understanding and agreement with plan.  - amLODIPine (NORVASC) 5 MG Tab; Take 1 Tab by mouth every day.  Dispense: 30 Tab; Refill: 1  - CBC WITH DIFFERENTIAL; Future  - Comp Metabolic Panel; Future  - Lipid Profile; Future    6. Encounter for screening mammogram for breast cancer  Pt states she is asymptomatic with no masses, nipple discharge or skin changes.  Screening mammogram ordered  - MA-SCREENING MAMMO BILAT W/CAD; Future    7. Screening for colorectal cancer  Pt is states she is asymptomatic.  Screening options discussed, she desires FIT.  FIT ordered.  - OCCULT BLOOD FECES IMMUNOASSAY (FIT); Future      Return in about 2 weeks (around 12/23/2020) for Bp check and lab review.    Please note that this dictation was created using voice recognition software. I have made every reasonable attempt to correct obvious errors, but I expect that there are errors of grammar and possibly content that I did not discover before finalizing the note.

## 2020-12-09 NOTE — ASSESSMENT & PLAN NOTE
Onset: yearly for last few years with last episode starting 2-3 months ago.  Only has symptoms in the Spring and fall and states Occurs daily when turning over in bed.  Has very infrequent symptoms otherwise when she turns quickly.  No symptom when driving.  No vision changes and had vision checked within the last 1.5 years and states her vision was improving.  No fever, chills, +congestion and runny nose around the time symptoms occur.  +intermittent scratchy throat with post nasal drip. No ear pain.  No dizziness with standing up quickly.  No nausea, vomiting or headaches.

## 2020-12-09 NOTE — ASSESSMENT & PLAN NOTE
Onset: a couple months ago.  Only has left hand tremor.  She is right handed.  No clear trigger.  No known worsening factor.  No known improving factors.  Reports good strength and no weakness. She has not tried anything for her symptoms.  No numbness or neck pain.

## 2020-12-09 NOTE — ASSESSMENT & PLAN NOTE
Onset: 5 months ago.  She initially thought it was due to compensating for her right ankle fracture, but it continued despite her other ankle improving.  Stats she rolled her ankle a week ago and symptoms improved.  States the pain previously was debilitating and very sharp and worse with walking.  Pain was on the top of her foot.

## 2020-12-10 ENCOUNTER — HOSPITAL ENCOUNTER (OUTPATIENT)
Dept: LAB | Facility: MEDICAL CENTER | Age: 60
End: 2020-12-10
Attending: FAMILY MEDICINE
Payer: COMMERCIAL

## 2020-12-10 DIAGNOSIS — R42 DIZZINESS: ICD-10-CM

## 2020-12-10 DIAGNOSIS — I10 HYPERTENSION, UNSPECIFIED TYPE: ICD-10-CM

## 2020-12-10 DIAGNOSIS — R25.1 TREMOR: ICD-10-CM

## 2020-12-10 DIAGNOSIS — E66.9 OBESITY (BMI 30-39.9): ICD-10-CM

## 2020-12-10 LAB
ALBUMIN SERPL BCP-MCNC: 4.4 G/DL (ref 3.2–4.9)
ALBUMIN/GLOB SERPL: 1.6 G/DL
ALP SERPL-CCNC: 64 U/L (ref 30–99)
ALT SERPL-CCNC: 35 U/L (ref 2–50)
ANION GAP SERPL CALC-SCNC: 11 MMOL/L (ref 7–16)
AST SERPL-CCNC: 26 U/L (ref 12–45)
BASOPHILS # BLD AUTO: 0.7 % (ref 0–1.8)
BASOPHILS # BLD: 0.04 K/UL (ref 0–0.12)
BILIRUB SERPL-MCNC: 0.5 MG/DL (ref 0.1–1.5)
BUN SERPL-MCNC: 14 MG/DL (ref 8–22)
CALCIUM SERPL-MCNC: 9.5 MG/DL (ref 8.5–10.5)
CHLORIDE SERPL-SCNC: 103 MMOL/L (ref 96–112)
CHOLEST SERPL-MCNC: 262 MG/DL (ref 100–199)
CO2 SERPL-SCNC: 26 MMOL/L (ref 20–33)
CREAT SERPL-MCNC: 0.97 MG/DL (ref 0.5–1.4)
EOSINOPHIL # BLD AUTO: 0.19 K/UL (ref 0–0.51)
EOSINOPHIL NFR BLD: 3.2 % (ref 0–6.9)
ERYTHROCYTE [DISTWIDTH] IN BLOOD BY AUTOMATED COUNT: 43.6 FL (ref 35.9–50)
EST. AVERAGE GLUCOSE BLD GHB EST-MCNC: 117 MG/DL
FASTING STATUS PATIENT QL REPORTED: NORMAL
GLOBULIN SER CALC-MCNC: 2.7 G/DL (ref 1.9–3.5)
GLUCOSE SERPL-MCNC: 93 MG/DL (ref 65–99)
HBA1C MFR BLD: 5.7 % (ref 0–5.6)
HCT VFR BLD AUTO: 47.6 % (ref 37–47)
HDLC SERPL-MCNC: 63 MG/DL
HGB BLD-MCNC: 15.5 G/DL (ref 12–16)
IMM GRANULOCYTES # BLD AUTO: 0.01 K/UL (ref 0–0.11)
IMM GRANULOCYTES NFR BLD AUTO: 0.2 % (ref 0–0.9)
LDLC SERPL CALC-MCNC: 178 MG/DL
LYMPHOCYTES # BLD AUTO: 2.14 K/UL (ref 1–4.8)
LYMPHOCYTES NFR BLD: 36.5 % (ref 22–41)
MCH RBC QN AUTO: 31.6 PG (ref 27–33)
MCHC RBC AUTO-ENTMCNC: 32.6 G/DL (ref 33.6–35)
MCV RBC AUTO: 97.1 FL (ref 81.4–97.8)
MONOCYTES # BLD AUTO: 0.55 K/UL (ref 0–0.85)
MONOCYTES NFR BLD AUTO: 9.4 % (ref 0–13.4)
NEUTROPHILS # BLD AUTO: 2.94 K/UL (ref 2–7.15)
NEUTROPHILS NFR BLD: 50 % (ref 44–72)
NRBC # BLD AUTO: 0 K/UL
NRBC BLD-RTO: 0 /100 WBC
PLATELET # BLD AUTO: 227 K/UL (ref 164–446)
PMV BLD AUTO: 10.7 FL (ref 9–12.9)
POTASSIUM SERPL-SCNC: 4.3 MMOL/L (ref 3.6–5.5)
PROT SERPL-MCNC: 7.1 G/DL (ref 6–8.2)
RBC # BLD AUTO: 4.9 M/UL (ref 4.2–5.4)
SODIUM SERPL-SCNC: 140 MMOL/L (ref 135–145)
T4 FREE SERPL-MCNC: 1.17 NG/DL (ref 0.93–1.7)
TRIGL SERPL-MCNC: 103 MG/DL (ref 0–149)
TSH SERPL DL<=0.005 MIU/L-ACNC: 3.17 UIU/ML (ref 0.38–5.33)
WBC # BLD AUTO: 5.9 K/UL (ref 4.8–10.8)

## 2020-12-10 PROCEDURE — 36415 COLL VENOUS BLD VENIPUNCTURE: CPT

## 2020-12-10 PROCEDURE — 80061 LIPID PANEL: CPT

## 2020-12-10 PROCEDURE — 80053 COMPREHEN METABOLIC PANEL: CPT

## 2020-12-10 PROCEDURE — 85025 COMPLETE CBC W/AUTO DIFF WBC: CPT

## 2020-12-10 PROCEDURE — 84439 ASSAY OF FREE THYROXINE: CPT

## 2020-12-10 PROCEDURE — 83036 HEMOGLOBIN GLYCOSYLATED A1C: CPT

## 2020-12-10 PROCEDURE — 84443 ASSAY THYROID STIM HORMONE: CPT

## 2020-12-11 DIAGNOSIS — Z12.11 SCREENING FOR COLORECTAL CANCER: ICD-10-CM

## 2020-12-11 DIAGNOSIS — Z12.12 SCREENING FOR COLORECTAL CANCER: ICD-10-CM

## 2020-12-12 LAB — HEMOCCULT STL QL IA: NEGATIVE

## 2020-12-17 ENCOUNTER — HOSPITAL ENCOUNTER (OUTPATIENT)
Dept: RADIOLOGY | Facility: MEDICAL CENTER | Age: 60
End: 2020-12-17
Payer: COMMERCIAL

## 2020-12-18 ENCOUNTER — TELEPHONE (OUTPATIENT)
Dept: MEDICAL GROUP | Facility: MEDICAL CENTER | Age: 60
End: 2020-12-18

## 2020-12-18 NOTE — TELEPHONE ENCOUNTER
Called pt to let her know that I am awaiting the comparison read re: mammogram.  She did not answer and voicemail is not set up.  Will try again later. -Dr. Beard

## 2020-12-22 ENCOUNTER — TELEPHONE (OUTPATIENT)
Dept: MEDICAL GROUP | Facility: MEDICAL CENTER | Age: 60
End: 2020-12-22

## 2020-12-22 NOTE — TELEPHONE ENCOUNTER
Phone Number Called: [phone]    Call outcome: Spoke to [facility]    Message: Spoke to them regarding the comparison of her mammogram with her previous mammograms and they were compared today. The results were bi-rad 2.

## 2020-12-22 NOTE — PROGRESS NOTES
Subjective:     CC: BP follow-up    HPI:   Gabriela presents today with     Essential hypertension  New issue.  Has started amlodipine 5mg daily which she started the day of last appointment with no negative SEs.  No chest pain, palpitations or SOB.  No headaches.  Only has dizziness unless she turns over quickly in bed.  Home Bps 136-150/86/95. Most recently mostly in mid SBP 140s-155.    Hyperlipidemia  She was not eating a low cholesterol diet prior to labs.    Left foot pain  Pt reports her foot pain has improved since last visit and not currently hurting her    Dizziness  Chronic issue that yearly occurs with worse symptoms in the spring and fall and occurs when she rotates her head.  No symptoms currently except when she rotates her head in bed.  No current congestion, runny nose, fevers, chills, sore throat.      Past Medical History:   Diagnosis Date   • Bell's palsy        Social History     Tobacco Use   • Smoking status: Never Smoker   • Smokeless tobacco: Never Used   Substance Use Topics   • Alcohol use: Yes     Frequency: 4 or more times a week     Drinks per session: 1 or 2     Binge frequency: Never     Comment: 5 times a week, wine or liquor   • Drug use: Never       Current Outpatient Medications Ordered in Epic   Medication Sig Dispense Refill   • amLODIPine (NORVASC) 5 MG Tab Take 1 Tab by mouth every day. 90 Tab 1     No current Epic-ordered facility-administered medications on file.        Allergies:  Seasonal    Health Maintenance: Tdap today    ROS:  Gen: no fevers/chills, no changes in weight  Eyes: no changes in vision  ENT: no sore throat, no hearing loss, no bloody nose  Pulm: no sob, no cough  CV: no chest pain, no palpitations  GI: no nausea/vomiting, no diarrhea  : no dysuria  MSk: no myalgias  Skin: no rash  Neuro: no headaches, no numbness/tingling  Heme/Lymph: no easy bruising      Objective:       Exam:  /72 (BP Location: Left arm, Patient Position: Sitting, BP Cuff Size:  "Adult)   Pulse 78   Temp 36.6 °C (97.9 °F) (Temporal)   Resp 14   Ht 1.676 m (5' 6\")   Wt 99.3 kg (219 lb)   SpO2 98%   BMI 35.35 kg/m²  Body mass index is 35.35 kg/m².    Gen: Alert and oriented, No apparent distress.  HEENT: No nystagmus with rotation of head.  PERRL.  Neck: Neck is supple without lymphadenopathy.  No carotid bruit.    Lungs: Normal effort, CTA bilaterally, no wheezes, rhonchi, or rales  CV: Regular rate and rhythm. No murmurs, rubs, or gallops.  Ext: No clubbing, cyanosis, edema.      Assessment & Plan:     60 y.o. female with the following -     1. Essential hypertension  Chronic, improved s/p starting amlodipine 5mg daily but pt reports still having some elevated Bps above goal of 140/90 at home.  Recommended increasing her amlodipine but she wants a trial of diet, exercise prior to increase dose.  Recommended close followup, home monitoring and UCC precautions given. Patient expressed understanding and agreement with plan.    2. Hyperglycemia  New issue noted with A1c on 5.7 on recent labs.  Dietary and lifestyle modifications discussed.    3. Hyperlipidemia, unspecified hyperlipidemia type  New issue with elevated LDL and total cholesterol but ASCVD10 year risk score is less than 7% today.  Dietary and lifestyle modifications discussed.  Will monitor.    4. Dizziness  Chronic, intermittent.  Previous labs show no anemia, normal thyroid levels, good electrolyte status.  Will check carotid US given HLD and possible carotid stenosis.  Follow-up precautions given.   - US-CAROTID DOPPLER BILAT; Future    6. Need for vaccination  Pt desires vaccination.  Risks and benefits discussed.  No contraindications today.  Vaccine given.  Follow-up precautions discussed.  - Tdap =>6yo IM      Return in about 4 weeks (around 1/20/2021) for Medication review.    Please note that this dictation was created using voice recognition software. I have made every reasonable attempt to correct obvious errors, " but I expect that there are errors of grammar and possibly content that I did not discover before finalizing the note.

## 2020-12-23 ENCOUNTER — OFFICE VISIT (OUTPATIENT)
Dept: MEDICAL GROUP | Facility: MEDICAL CENTER | Age: 60
End: 2020-12-23
Payer: COMMERCIAL

## 2020-12-23 VITALS
RESPIRATION RATE: 14 BRPM | WEIGHT: 219 LBS | HEART RATE: 78 BPM | SYSTOLIC BLOOD PRESSURE: 136 MMHG | HEIGHT: 66 IN | TEMPERATURE: 97.9 F | OXYGEN SATURATION: 98 % | DIASTOLIC BLOOD PRESSURE: 72 MMHG | BODY MASS INDEX: 35.2 KG/M2

## 2020-12-23 DIAGNOSIS — E78.5 HYPERLIPIDEMIA, UNSPECIFIED HYPERLIPIDEMIA TYPE: ICD-10-CM

## 2020-12-23 DIAGNOSIS — M79.672 LEFT FOOT PAIN: ICD-10-CM

## 2020-12-23 DIAGNOSIS — I10 ESSENTIAL HYPERTENSION: ICD-10-CM

## 2020-12-23 DIAGNOSIS — R73.9 HYPERGLYCEMIA: ICD-10-CM

## 2020-12-23 DIAGNOSIS — Z23 NEED FOR VACCINATION: ICD-10-CM

## 2020-12-23 DIAGNOSIS — I10 HYPERTENSION, UNSPECIFIED TYPE: ICD-10-CM

## 2020-12-23 DIAGNOSIS — R42 DIZZINESS: ICD-10-CM

## 2020-12-23 PROCEDURE — 99214 OFFICE O/P EST MOD 30 MIN: CPT | Mod: 25 | Performed by: FAMILY MEDICINE

## 2020-12-23 PROCEDURE — 90715 TDAP VACCINE 7 YRS/> IM: CPT | Performed by: FAMILY MEDICINE

## 2020-12-23 PROCEDURE — 90471 IMMUNIZATION ADMIN: CPT | Performed by: FAMILY MEDICINE

## 2020-12-23 RX ORDER — AMLODIPINE BESYLATE 5 MG/1
5 TABLET ORAL DAILY
Qty: 90 TAB | Refills: 1 | Status: SHIPPED | OUTPATIENT
Start: 2020-12-23 | End: 2021-02-10 | Stop reason: SDUPTHER

## 2020-12-23 ASSESSMENT — FIBROSIS 4 INDEX: FIB4 SCORE: 1.14

## 2020-12-23 NOTE — ASSESSMENT & PLAN NOTE
New issue.  Has started amlodipine 5mg daily which she started the day of last appointment with no negative SEs.  No chest pain, palpitations or SOB.  No headaches.  Only has dizziness unless she turns over quickly in bed.  Home Bps 136-150/86/95. Most recently mostly in mid SBP 140s-155.

## 2020-12-23 NOTE — ASSESSMENT & PLAN NOTE
Chronic issue that yearly occurs with worse symptoms in the spring and fall and occurs when she rotates her head.  No symptoms currently except when she rotates her head in bed.  No current congestion, runny nose, fevers, chills, sore throat.

## 2020-12-30 ENCOUNTER — HOSPITAL ENCOUNTER (OUTPATIENT)
Dept: RADIOLOGY | Facility: MEDICAL CENTER | Age: 60
End: 2020-12-30
Attending: FAMILY MEDICINE
Payer: COMMERCIAL

## 2020-12-30 DIAGNOSIS — R42 DIZZINESS: ICD-10-CM

## 2020-12-30 PROCEDURE — 93880 EXTRACRANIAL BILAT STUDY: CPT

## 2021-01-04 ENCOUNTER — PATIENT MESSAGE (OUTPATIENT)
Dept: MEDICAL GROUP | Facility: MEDICAL CENTER | Age: 61
End: 2021-01-04

## 2021-01-04 DIAGNOSIS — R10.2 PELVIC PAIN: ICD-10-CM

## 2021-01-06 ENCOUNTER — PATIENT MESSAGE (OUTPATIENT)
Dept: MEDICAL GROUP | Facility: MEDICAL CENTER | Age: 61
End: 2021-01-06

## 2021-01-06 NOTE — TELEPHONE ENCOUNTER
From: Gabriela Mosquera  To: Lilliam Beard D.O.  Sent: 1/6/2021 7:39 AM PST  Subject: Non-Urgent Medical Question    I would prefer a gynecology appt since I'm due for it anyway and I'm hoping I can get an appt before the 20th. This reduced sleeping pattern is bad even for me.   Is there someone you can refer me to or a list I can try for an appt?  Thank you      ----- Message -----   From:Lilliam Beard D.O.   Sent:1/5/2021 8:15 PM PST   To:Gabriela Mosquera   Subject:RE: Non-Urgent Medical Question    I can start the work up for you or you can see gynecology for further evaluation. My next opening isn't until at least 1/20/2021, so I have placed a gynecology referral for you in case you would like to see them regarding this issue. If you would like me to start the work-up for you and get you on my schedule, please call [phone] to schedule an appointment. Thank you - Dr. Beard      ----- Message -----   From:Gabriela Mosquera   Sent:1/5/2021 4:24 AM PST   To:Lilliam Beard D.O.   Subject:RE: Non-Urgent Medical Question    After having sex, I'm having some constant pain across my lower back area. It's not a pinched nerve kind of pain. I'm fine during the day, standing and sitting (for the most part) is all good but when I lay down to sleep, the pain keeps me awake. Getting an hour +/- of sleep at a time. I'm wondering if the herniation that was diagnosed in June of 2016 is the issue? Also, I haven't had an obgyn appt since that June 2016 appt.      ----- Message -----   From:Lilliam Beard D.O.   Sent:1/4/2021 2:46 PM PST   To:Gabriela Mosquera   Subject:RE: Non-Urgent Medical Question    It depends on what the issue is. What would you liked evaluated by the obgyn?    -Dr. Beard      ----- Message -----   From:Gabriela Mosquera   Sent:1/4/2021 8:07 AM PST   To:Lilliam Beard D.O.   Subject:Non-Urgent Medical Question    Good morning, do I need a referral from you to  see an obgyn? Do you have a recommendation? I'm having an issue and hopefully can see someone sooner than later.  Thank you,  Gabriela Mosquera

## 2021-02-05 ENCOUNTER — TELEPHONE (OUTPATIENT)
Dept: PHYSICAL THERAPY | Facility: REHABILITATION | Age: 61
End: 2021-02-05

## 2021-02-05 NOTE — OP THERAPY DISCHARGE SUMMARY
Outpatient Physical Therapy  DISCHARGE SUMMARY NOTE      [facility] Physical Therapy [address]  [address]  [place] [address]  Phone:  [phone]  Fax:  [phone]    Date of Visit: 02/05/2021    Patient: Gabriela Mosquera  YOB: 1960  MRN: [id number]     Referring Provider: Aly Gamino M.D.  [address] [place] [address]   Referring Diagnosis Displaced trimalleolar fracture of right lower leg, initial encounter for closed fracture [S82.851A]             Your patient is being discharged from Physical Therapy with the following comments:   · Patient has failed to schedule or reschedule follow-up visits    Comments:  Pt attended 3 PT visits 4/15 to 5/6/20.       Alix Evans, PT    Date: 2/5/2021

## 2021-02-09 NOTE — PROGRESS NOTES
Subjective:     CC: follow-up dizziness    HPI:   Gabriela presents today with     Dizziness  Chronic, states that her dizziness has improved as her blood pressure has improved.  Has mild allergic congestion but no runny nose. +itchy eyes.  No fevers or chills.  No sore throat.  States the dizziness is now very rare and only when she moves her head.  No weakness or numbness.  No vision or hearing changes.  No nausea, vomiting.    Essential hypertension  Chronic.  She was previously started on amlodipine 5mg.  Home Bps were in the 120-130s at home.  She last checked her BP about 2 weeks ago.  No chest pain, palpitations or SOB.    Pt has seen gynecology since last visit who recommended she get a pelvic ultrasound.  States her pelvic pain has not occurred.  She did not have a pap smear done.    Past Medical History:   Diagnosis Date   • Bell's palsy        Social History     Tobacco Use   • Smoking status: Never Smoker   • Smokeless tobacco: Never Used   Substance Use Topics   • Alcohol use: Yes     Comment: 5 times a week, wine or liquor   • Drug use: Never       Current Outpatient Medications Ordered in Epic   Medication Sig Dispense Refill   • amLODIPine (NORVASC) 5 MG Tab Take 1 tablet by mouth every day. 90 tablet 1   • cetirizine (ZYRTEC) 10 MG Tab Take 1 tablet by mouth every day. 30 tablet 1     No current Epic-ordered facility-administered medications on file.       Allergies:  Seasonal    Health Maintenance: pap smear next visit    ROS:  Gen: no fevers/chills, no changes in weight  Eyes: no changes in vision  ENT: no sore throat, no hearing loss, no bloody nose  Pulm: no sob, no cough  CV: no chest pain, no palpitations  GI: no nausea/vomiting, no diarrhea  : no dysuria  MSk: no myalgias  Skin: no rash  Neuro: no headaches, no numbness/tingling  Heme/Lymph: no easy bruising      Objective:       Exam:  /82 (BP Location: Right arm, Patient Position: Sitting, BP Cuff Size: Adult)   Pulse 66   Temp 36.5  "°C (97.7 °F) (Temporal)   Resp 12   Ht 1.689 m (5' 6.5\")   Wt 98 kg (216 lb)   SpO2 96%   BMI 34.34 kg/m²  Body mass index is 34.34 kg/m².    Gen: Alert and oriented, No apparent distress.  HEENT:TMs normal bilaterally.  No sinus tenders.  Eyes with erythematous conjunctiva.  +boggy and erythematous turbinates.  COVID19 exposure risk discussed prior to HEENT examination and pt agrees to this examination.  Neck: Neck is supple without lymphadenopathy.  Lungs: Normal effort, CTA bilaterally, no wheezes, rhonchi, or rales  CV: Regular rate and rhythm. No murmurs, rubs, or gallops.  Ext: No clubbing, cyanosis, edema.      Assessment & Plan:     60 y.o. female with the following -     1. Dizziness  Chronic, improved.  Previous labs and imaging without clear etiology, however given that symptoms improved when her BP improved this was most likely secondary to HTN.  Will continue to treat her HTN and monitor.    2. Hyperlipidemia, unspecified hyperlipidemia type  Chronic, stable.  Dietary and lifestyle modifications discussed. Will monitor.    4. Hyperglycemia  Chronic, stable, asymptomatic.  Dietary and lifestyle modifications discussed. Will monitor.    5. Seasonal allergic rhinitis due to pollen  Chronic, stable, persistent for 2 years despite eye drops.  Recommended daily zyrtec (SE profile discussed), humidifier use.  Will monitor.  - cetirizine (ZYRTEC) 10 MG Tab; Take 1 tablet by mouth every day.  Dispense: 30 tablet; Refill: 1    6. Hypertension, unspecified type  Chronic, improved with home Bps at goal of less than 140/90, asymptomatic.  Continue current medications.  Dietary and lifestyle modifications discussed.  Will monitor.  - amLODIPine (NORVASC) 5 MG Tab; Take 1 tablet by mouth every day.  Dispense: 90 tablet; Refill: 1      Return in about 4 weeks (around 3/10/2021) for pap smear.    Please note that this dictation was created using voice recognition software. I have made every reasonable attempt to " correct obvious errors, but I expect that there are errors of grammar and possibly content that I did not discover before finalizing the note.

## 2021-02-10 ENCOUNTER — OFFICE VISIT (OUTPATIENT)
Dept: MEDICAL GROUP | Facility: MEDICAL CENTER | Age: 61
End: 2021-02-10
Payer: COMMERCIAL

## 2021-02-10 VITALS
DIASTOLIC BLOOD PRESSURE: 82 MMHG | RESPIRATION RATE: 12 BRPM | HEART RATE: 66 BPM | OXYGEN SATURATION: 96 % | HEIGHT: 67 IN | BODY MASS INDEX: 33.9 KG/M2 | WEIGHT: 216 LBS | TEMPERATURE: 97.7 F | SYSTOLIC BLOOD PRESSURE: 136 MMHG

## 2021-02-10 DIAGNOSIS — R73.9 HYPERGLYCEMIA: ICD-10-CM

## 2021-02-10 DIAGNOSIS — I10 HYPERTENSION, UNSPECIFIED TYPE: ICD-10-CM

## 2021-02-10 DIAGNOSIS — R42 DIZZINESS: ICD-10-CM

## 2021-02-10 DIAGNOSIS — J30.1 SEASONAL ALLERGIC RHINITIS DUE TO POLLEN: ICD-10-CM

## 2021-02-10 DIAGNOSIS — I10 ESSENTIAL HYPERTENSION: ICD-10-CM

## 2021-02-10 DIAGNOSIS — E78.5 HYPERLIPIDEMIA, UNSPECIFIED HYPERLIPIDEMIA TYPE: ICD-10-CM

## 2021-02-10 PROBLEM — J30.9 ALLERGIC RHINITIS: Status: ACTIVE | Noted: 2021-02-10

## 2021-02-10 PROCEDURE — 99213 OFFICE O/P EST LOW 20 MIN: CPT | Performed by: FAMILY MEDICINE

## 2021-02-10 RX ORDER — AMLODIPINE BESYLATE 5 MG/1
5 TABLET ORAL DAILY
Qty: 90 TABLET | Refills: 1 | Status: SHIPPED | OUTPATIENT
Start: 2021-02-10 | End: 2021-06-30 | Stop reason: SDUPTHER

## 2021-02-10 RX ORDER — CETIRIZINE HYDROCHLORIDE 10 MG/1
10 TABLET ORAL DAILY
Qty: 30 TABLET | Refills: 1 | Status: SHIPPED | OUTPATIENT
Start: 2021-02-10 | End: 2022-03-24

## 2021-02-10 ASSESSMENT — FIBROSIS 4 INDEX: FIB4 SCORE: 1.14

## 2021-02-10 ASSESSMENT — PATIENT HEALTH QUESTIONNAIRE - PHQ9: CLINICAL INTERPRETATION OF PHQ2 SCORE: 0

## 2021-02-10 NOTE — ASSESSMENT & PLAN NOTE
Chronic.  She was previously started on amlodipine 5mg.  Home Bps were in the 120-130s at home.  She last checked her BP about 2 weeks ago.  No chest pain, palpitations or SOB.

## 2021-02-10 NOTE — ASSESSMENT & PLAN NOTE
Chronic, states that her dizziness has improved as her blood pressure has improved.  Has mild allergic congestion but no runny nose. +itchy eyes.  No fevers or chills.  No sore throat.  States the dizziness is now very rare and only when she moves her head.  No weakness or numbness.  No vision or hearing changes.  No nausea, vomiting.

## 2021-02-18 ENCOUNTER — HOSPITAL ENCOUNTER (OUTPATIENT)
Dept: RADIOLOGY | Facility: MEDICAL CENTER | Age: 61
End: 2021-02-18
Attending: OBSTETRICS & GYNECOLOGY
Payer: COMMERCIAL

## 2021-02-18 DIAGNOSIS — R10.2 PELVIC PAIN IN FEMALE: ICD-10-CM

## 2021-02-18 PROCEDURE — 76830 TRANSVAGINAL US NON-OB: CPT

## 2021-03-03 ENCOUNTER — PATIENT MESSAGE (OUTPATIENT)
Dept: MEDICAL GROUP | Facility: MEDICAL CENTER | Age: 61
End: 2021-03-03

## 2021-03-09 NOTE — PROGRESS NOTES
Subjective:     CC:   Chief Complaint   Patient presents with   • Gynecologic Exam     Pap       HPI:   Gabriela Mosquera is a 60 y.o. female who presents for annual exam    Patient has GYN provider: No   Last Pap Smear: 6 years ago  H/O Abnormal Pap: No  Last Mammogram: 2020  Last Colorectal Cancer Screenin2020  Last Tdap: 2020    Exercise: moderate regular exercise, aerobic < 3 days a week  Diet: working to eat a well balanced     No LMP recorded. Patient is postmenopausal.  She has not utilized hormone replacement therapy.  Reports night sweats  No significant bloating/fluid retention. No abnormal vaginal discharge.   She had pelvic discomfort while having sex in 2021.  She was seen by gynecology who ordered a pelvic ultrasound that showed a 3mm endoetrial stripe, no adnexal masses or cyst and the ovaries were not seen. She has not had repeat pelvic pain since that time.     No breast tenderness, mass, nipple discharge or changes in size or contour.     Home Bps are in the 120s. No ches tpain, palpitations, SOB, headaches.  Dizziness has improved.    OB History   No obstetric history on file.      She  has no history on file for sexual activity.    She  has a past medical history of Bell's palsy.  She  has a past surgical history that includes open reduction (Right, 2020).    Family History   Problem Relation Age of Onset   • Diabetes Mother    • Hypertension Mother    • Cancer Neg Hx    • Stroke Neg Hx      Social History     Tobacco Use   • Smoking status: Never Smoker   • Smokeless tobacco: Never Used   Substance Use Topics   • Alcohol use: Yes     Comment: 5 times a week, wine or liquor   • Drug use: Never       Patient Active Problem List    Diagnosis Date Noted   • Pelvic pain 03/10/2021   • Allergic rhinitis 02/10/2021   • Essential hypertension 2020   • Hyperglycemia 2020   • Hyperlipidemia 2020   • Dizziness 2020   • Tremor 2020   • Left foot  "pain 12/09/2020   • Obesity (BMI 30-39.9) 12/09/2020   • Herniation of rectum into vagina 06/17/2016     Current Outpatient Medications   Medication Sig Dispense Refill   • amLODIPine (NORVASC) 5 MG Tab Take 1 tablet by mouth every day. 90 tablet 1   • cetirizine (ZYRTEC) 10 MG Tab Take 1 tablet by mouth every day. 30 tablet 1     No current facility-administered medications for this visit.     Allergies   Allergen Reactions   • Seasonal        Review of Systems   Constitutional: Negative for fever, chills and malaise/fatigue.   HENT: Negative for congestion.    Eyes: Negative for pain.   Respiratory: Negative for cough and shortness of breath.    Cardiovascular: Negative for chest pain and leg swelling.   Gastrointestinal: Negative for nausea, vomiting, abdominal pain and diarrhea.   Genitourinary: Negative for dysuria and hematuria.   Skin: Negative for rash.   Neurological: Negative for dizziness, focal weakness and headaches.   Endo/Heme/Allergies: Does not bruise/bleed easily.   Psychiatric/Behavioral: Negative for depression.  The patient is not nervous/anxious.      Objective:   /82 (BP Location: Left arm, Patient Position: Sitting, BP Cuff Size: Adult)   Pulse 60   Temp 36.3 °C (97.3 °F) (Temporal)   Resp 12   Ht 1.676 m (5' 6\")   Wt 98.4 kg (217 lb)   SpO2 95%   BMI 35.02 kg/m²     Wt Readings from Last 4 Encounters:   03/10/21 98.4 kg (217 lb)   02/10/21 98 kg (216 lb)   12/23/20 99.3 kg (219 lb)   12/09/20 100 kg (220 lb 7.4 oz)       A chaperone was offered to the patient during today's exam. Chaperone name: Lacy Mata was present.    Physical Exam:  Constitutional: Well-developed and well-nourished. Not diaphoretic. No distress.   Skin: Skin is warm and dry. +erythema along her upper forehead and multiple abnormal nevi on her bilateral lower arms  Head: Atraumatic without lesions.  Eyes: Conjunctivae and extraocular motions are normal. Pupils are equal, round, and reactive to light. No " scleral icterus.   Ears:  External ears unremarkable. Tympanic membranes clear and intact.  Nose/mouth/throat: nasal and OP examinations deferred given COVID19 exposure risk  Neck: Supple, trachea midline. Normal range of motion. No thyromegaly present. No lymphadenopathy--cervical or supraclavicular.  Cardiovascular: Regular rate and rhythm, S1 and S2 without murmur, rubs, or gallops.    Respiratory: Effort normal. Clear to auscultation throughout. No adventitious sounds.   Breast: Breasts examined seated and supine. No skin changes, peau d'orange or nipple retraction. No discharge. No axillary or supraclavicular adenopathy. No masses or nodularity palpable.  Abdomen: Soft, non tender, and without distention. Active bowel sounds in all four quadrants. No rebound, guarding, masses or HSM.  : Perineum and external genitalia normal without rash. Vagina with normal and physiologic discharge. Cervix without visible lesions or discharge. Bimanual exam without adnexal masses or cervical motion tenderness.  Extremities: No cyanosis, clubbing, erythema, nor edema. Distal pulses intact and symmetric.   Musculoskeletal: All major joints AROM full in all directions without pain.  Neurological: Alert and oriented x 3. Grossly non-focal. Strength and sensation grossly intact.   Psychiatric:  Behavior, mood, and affect are appropriate.    Assessment and Plan:     1. Well woman exam with routine gynecological exam  Pt is here for PE with pap smear.  PE was normal except for the atypical nevi.  She had no pelvic pain today and no pelvic abnormalities noted on PE.  Pap obtained and sent.  Pt to return for re-evaluation immediately if pelvic pain happens again.    2. Screening for cervical cancer  Pt is due for pap smear.  Pap obtained and sent.  - Thinprep Pap with HPV; Future    3. Encounter for gynecological examination  Pt is due for pap smear.  Pap obtained and sent.  - Thinprep Pap with HPV; Future    4. Screening for HPV  (human papillomavirus)  Pt is due for pap smear.  Pap obtained and sent.  - Thinprep Pap with HPV; Future    5. Atypical nevi  New issue, multiple abnormal nevi noted on her bilateral arms and forehead.  She reports these lesions are itchy and growing.  Urgent Derm referral given.  - REFERRAL TO DERMATOLOGY    6. Pelvic pain  New issue that occurred 1/2021.  She had ultrasound as ordered by gynecology which was not able to visualize her ovaries.  Given no repeat pain, normal PE (however some limitation in feeling her ovaries given her habitus), and otherwise normal US, will monitor. Will consider getting CEA if pain occurs again. Follow-up precautions for her to be seen urgently if pain returns discussed.  Patient expressed understanding and agreement with plan.    Health maintenance  Labs per orders  Immunizations per orders  Patient counseled about skin care, diet, supplements, and exercise.  Discussed breast self exam     Follow-up: Return in about 6 months (around 9/10/2021) for Medication review.

## 2021-03-10 ENCOUNTER — HOSPITAL ENCOUNTER (OUTPATIENT)
Facility: MEDICAL CENTER | Age: 61
End: 2021-03-10
Attending: FAMILY MEDICINE
Payer: COMMERCIAL

## 2021-03-10 ENCOUNTER — OFFICE VISIT (OUTPATIENT)
Dept: MEDICAL GROUP | Facility: MEDICAL CENTER | Age: 61
End: 2021-03-10
Payer: COMMERCIAL

## 2021-03-10 VITALS
SYSTOLIC BLOOD PRESSURE: 138 MMHG | BODY MASS INDEX: 34.87 KG/M2 | DIASTOLIC BLOOD PRESSURE: 82 MMHG | HEART RATE: 60 BPM | RESPIRATION RATE: 12 BRPM | TEMPERATURE: 97.3 F | OXYGEN SATURATION: 95 % | HEIGHT: 66 IN | WEIGHT: 217 LBS

## 2021-03-10 DIAGNOSIS — Z01.419 ENCOUNTER FOR GYNECOLOGICAL EXAMINATION: ICD-10-CM

## 2021-03-10 DIAGNOSIS — Z12.4 SCREENING FOR CERVICAL CANCER: ICD-10-CM

## 2021-03-10 DIAGNOSIS — Z01.419 WELL WOMAN EXAM WITH ROUTINE GYNECOLOGICAL EXAM: ICD-10-CM

## 2021-03-10 DIAGNOSIS — Z11.51 SCREENING FOR HPV (HUMAN PAPILLOMAVIRUS): ICD-10-CM

## 2021-03-10 DIAGNOSIS — R10.2 PELVIC PAIN: ICD-10-CM

## 2021-03-10 DIAGNOSIS — D22.9 ATYPICAL NEVI: ICD-10-CM

## 2021-03-10 PROCEDURE — 87624 HPV HI-RISK TYP POOLED RSLT: CPT

## 2021-03-10 PROCEDURE — 88175 CYTOPATH C/V AUTO FLUID REDO: CPT

## 2021-03-10 PROCEDURE — 99396 PREV VISIT EST AGE 40-64: CPT | Performed by: FAMILY MEDICINE

## 2021-03-10 ASSESSMENT — FIBROSIS 4 INDEX: FIB4 SCORE: 1.14

## 2021-03-11 LAB
CYTOLOGY REG CYTOL: NORMAL
HPV HR 12 DNA CVX QL NAA+PROBE: NEGATIVE
HPV16 DNA SPEC QL NAA+PROBE: NEGATIVE
HPV18 DNA SPEC QL NAA+PROBE: NEGATIVE
SPECIMEN SOURCE: NORMAL

## 2021-03-15 DIAGNOSIS — Z23 NEED FOR VACCINATION: ICD-10-CM

## 2021-03-16 ENCOUNTER — APPOINTMENT (RX ONLY)
Dept: URBAN - METROPOLITAN AREA CLINIC 4 | Facility: CLINIC | Age: 61
Setting detail: DERMATOLOGY
End: 2021-03-16

## 2021-03-16 DIAGNOSIS — L21.8 OTHER SEBORRHEIC DERMATITIS: ICD-10-CM

## 2021-03-16 DIAGNOSIS — L57.8 OTHER SKIN CHANGES DUE TO CHRONIC EXPOSURE TO NONIONIZING RADIATION: ICD-10-CM

## 2021-03-16 DIAGNOSIS — L57.0 ACTINIC KERATOSIS: ICD-10-CM

## 2021-03-16 PROCEDURE — ? PRESCRIPTION

## 2021-03-16 PROCEDURE — ? COUNSELING

## 2021-03-16 PROCEDURE — 99203 OFFICE O/P NEW LOW 30 MIN: CPT | Mod: 25

## 2021-03-16 PROCEDURE — ? ADDITIONAL NOTES

## 2021-03-16 PROCEDURE — 17003 DESTRUCT PREMALG LES 2-14: CPT

## 2021-03-16 PROCEDURE — ? LIQUID NITROGEN

## 2021-03-16 PROCEDURE — 17000 DESTRUCT PREMALG LESION: CPT

## 2021-03-16 PROCEDURE — ? REFERRAL CORRESPONDENCE

## 2021-03-16 RX ORDER — HYDROCORTISONE 25 MG/G
OINTMENT TOPICAL
Qty: 1 | Refills: 3 | Status: ERX | COMMUNITY
Start: 2021-03-16

## 2021-03-16 RX ADMIN — HYDROCORTISONE: 25 OINTMENT TOPICAL at 00:00

## 2021-03-16 ASSESSMENT — LOCATION DETAILED DESCRIPTION DERM
LOCATION DETAILED: LEFT RADIAL DORSAL HAND
LOCATION DETAILED: 2ND WEB SPACE RIGHT HAND
LOCATION DETAILED: RIGHT DORSAL MIDDLE FINGER METACARPOPHALANGEAL JOINT
LOCATION DETAILED: LEFT SUPERIOR MEDIAL FOREHEAD
LOCATION DETAILED: RIGHT RADIAL DORSAL HAND

## 2021-03-16 ASSESSMENT — LOCATION SIMPLE DESCRIPTION DERM
LOCATION SIMPLE: LEFT FOREHEAD
LOCATION SIMPLE: LEFT HAND
LOCATION SIMPLE: RIGHT HAND

## 2021-03-16 ASSESSMENT — LOCATION ZONE DERM
LOCATION ZONE: HAND
LOCATION ZONE: FACE

## 2021-03-16 NOTE — HPI: RASH
What Type Of Note Output Would You Prefer (Optional)?: Standard Output
Is The Patient Presenting As Previously Scheduled?: Yes
How Severe Is Your Rash?: moderate
Is This A New Presentation, Or A Follow-Up?: Referral for Rash
Additional History: New patient.
Who Is Your Referring Provider?: Pati Pati

## 2021-03-16 NOTE — HPI: SKIN LESIONS
Is This A New Presentation, Or A Follow-Up?: Skin Lesions
How Severe Is Your Skin Lesion?: moderate
Have Your Skin Lesions Been Treated?: not been treated
Additional History: New patient.

## 2021-03-16 NOTE — PROCEDURE: ADDITIONAL NOTES
Additional Notes: Briefly discussed use of 5FU, will re-discuss next January and possibly initiate treatment
Detail Level: Simple
Render Risk Assessment In Note?: no

## 2021-03-17 ENCOUNTER — IMMUNIZATION (OUTPATIENT)
Dept: FAMILY PLANNING/WOMEN'S HEALTH CLINIC | Facility: IMMUNIZATION CENTER | Age: 61
End: 2021-03-17
Attending: INTERNAL MEDICINE
Payer: COMMERCIAL

## 2021-03-17 DIAGNOSIS — Z23 NEED FOR VACCINATION: ICD-10-CM

## 2021-03-17 DIAGNOSIS — Z23 ENCOUNTER FOR VACCINATION: Primary | ICD-10-CM

## 2021-03-17 PROCEDURE — 91300 PFIZER SARS-COV-2 VACCINE: CPT

## 2021-03-17 PROCEDURE — 0001A PFIZER SARS-COV-2 VACCINE: CPT

## 2021-03-26 ENCOUNTER — TELEPHONE (OUTPATIENT)
Dept: OBGYN | Facility: CLINIC | Age: 61
End: 2021-03-26

## 2021-03-26 NOTE — TELEPHONE ENCOUNTER
Pt called wondering if her last visit was billed through her ins. I explained to pt that our billing department would have a better answer for her. Pt understands an agrees. I then directed pt over to billing dpt.

## 2021-04-08 ENCOUNTER — IMMUNIZATION (OUTPATIENT)
Dept: FAMILY PLANNING/WOMEN'S HEALTH CLINIC | Facility: IMMUNIZATION CENTER | Age: 61
End: 2021-04-08
Attending: INTERNAL MEDICINE
Payer: COMMERCIAL

## 2021-04-08 DIAGNOSIS — Z23 ENCOUNTER FOR VACCINATION: Primary | ICD-10-CM

## 2021-04-08 PROCEDURE — 0002A PFIZER SARS-COV-2 VACCINE: CPT

## 2021-04-08 PROCEDURE — 91300 PFIZER SARS-COV-2 VACCINE: CPT

## 2021-06-30 DIAGNOSIS — I10 HYPERTENSION, UNSPECIFIED TYPE: ICD-10-CM

## 2021-06-30 RX ORDER — AMLODIPINE BESYLATE 5 MG/1
5 TABLET ORAL DAILY
Qty: 90 TABLET | Refills: 1 | Status: SHIPPED | OUTPATIENT
Start: 2021-06-30 | End: 2021-09-23 | Stop reason: SDUPTHER

## 2021-09-21 NOTE — PROGRESS NOTES
"Subjective:     CC: HTN follow-up*    HPI:   Gabriela presents today with     She is not consistently eating a low cholesterol and low glycemic diet.  She is drinking 1 or less glasses of wine per night.    Essential hypertension  Chronic issue, asymptomatic.  Not checking home Bps.  No chest pain, palpitations, SOB, headaches, weakness or numbness.  Only has dizziness when lying flat and moves her head quickly      Past Medical History:   Diagnosis Date   • Bell's palsy        Social History     Tobacco Use   • Smoking status: Never Smoker   • Smokeless tobacco: Never Used   Vaping Use   • Vaping Use: Never used   Substance Use Topics   • Alcohol use: Yes     Comment: 5 times a week, wine or liquor   • Drug use: Never       Current Outpatient Medications Ordered in Epic   Medication Sig Dispense Refill   • amLODIPine (NORVASC) 5 MG Tab Take 1 Tablet by mouth every day. 90 Tablet 3   • cetirizine (ZYRTEC) 10 MG Tab Take 1 tablet by mouth every day. 30 tablet 1     No current Epic-ordered facility-administered medications on file.       Allergies:  Seasonal    Health Maintenance: FIT and hep C screening ordered    ROS:  Gen: no fevers/chills, no changes in weight  Pulm: no sob, no cough  CV: no chest pain, no palpitations  GI: no nausea/vomiting, no diarrhea  Neuro: no headaches, no numbness/tingling      Objective:       Exam:  /70 (BP Location: Left arm, Patient Position: Sitting, BP Cuff Size: Adult)   Pulse 61   Temp 36.6 °C (97.8 °F) (Temporal)   Resp 12   Ht 1.676 m (5' 6\")   Wt 98 kg (216 lb)   SpO2 98%   BMI 34.86 kg/m²  Body mass index is 34.86 kg/m².    Gen: Alert and oriented, No apparent distress.  Neck: Neck is supple without lymphadenopathy.  Lungs: Normal effort, CTA bilaterally, no wheezes, rhonchi, or rales  CV: Regular rate and rhythm. No murmurs, rubs, or gallops.  Ext: No clubbing, cyanosis, edema.    Labs: 12/10/2021 labs reviewed with patient    Assessment & Plan:     61 y.o. female " with the following -     1. Essential hypertension  Chronic, well controlled, asymptomatic.  Continue current medication.  Refill given. Will monitor labs.  Dietary and exercise guidance given.  - Lipid Profile; Future  - Comp Metabolic Panel; Future  - amLODIPine (NORVASC) 5 MG Tab; Take 1 Tablet by mouth every day.  Dispense: 90 Tablet; Refill: 3    2. Need for vaccination  Pt desires vaccination.  Risks and benefits discussed.  No contraindications today.  Vaccine given.  Follow-up precautions discussed.  - INFLUENZA VACCINE QUAD INJ (PF)    3. Screen for colon cancer  Asymptomatic.  Yearly screening test ordered.  - OCCULT BLOOD FECES IMMUNOASSAY; Future    4. Hyperlipidemia, unspecified hyperlipidemia type  Chronic issue, asymptomatic.  Will check labs and start statin prn.  Dietary and lifestyle modifications discussed.  Will monitor.  - Lipid Profile; Future  - Comp Metabolic Panel; Future    5. Hyperglycemia  Chronic issue, asymptomatic.  Will check labs.  Dietary and lifestyle modifications discussed.  Will monitor.  - HEMOGLOBIN A1C; Future  - Comp Metabolic Panel; Future    6. Healthcare maintenance  - HEMOGLOBIN A1C; Future  - Lipid Profile; Future  - Comp Metabolic Panel; Future  - OCCULT BLOOD FECES IMMUNOASSAY; Future    7. Need for hepatitis C screening test  Pt is due for hep C screening per USPTF guidelines and agrees to obtaining screening testing.  - HCV Scrn ( 4962-4411 1xLife); Future      Return in about 6 months (around 3/23/2022) for Medication review.    Please note that this dictation was created using voice recognition software. I have made every reasonable attempt to correct obvious errors, but I expect that there are errors of grammar and possibly content that I did not discover before finalizing the note.

## 2021-09-23 ENCOUNTER — OFFICE VISIT (OUTPATIENT)
Dept: MEDICAL GROUP | Facility: MEDICAL CENTER | Age: 61
End: 2021-09-23
Payer: COMMERCIAL

## 2021-09-23 VITALS
WEIGHT: 216 LBS | BODY MASS INDEX: 34.72 KG/M2 | TEMPERATURE: 97.8 F | OXYGEN SATURATION: 98 % | SYSTOLIC BLOOD PRESSURE: 122 MMHG | RESPIRATION RATE: 12 BRPM | HEIGHT: 66 IN | DIASTOLIC BLOOD PRESSURE: 70 MMHG | HEART RATE: 61 BPM

## 2021-09-23 DIAGNOSIS — Z00.00 HEALTHCARE MAINTENANCE: ICD-10-CM

## 2021-09-23 DIAGNOSIS — Z12.11 SCREEN FOR COLON CANCER: ICD-10-CM

## 2021-09-23 DIAGNOSIS — I10 ESSENTIAL HYPERTENSION: ICD-10-CM

## 2021-09-23 DIAGNOSIS — Z11.59 NEED FOR HEPATITIS C SCREENING TEST: ICD-10-CM

## 2021-09-23 DIAGNOSIS — R73.9 HYPERGLYCEMIA: ICD-10-CM

## 2021-09-23 DIAGNOSIS — I10 HYPERTENSION, UNSPECIFIED TYPE: ICD-10-CM

## 2021-09-23 DIAGNOSIS — Z23 NEED FOR VACCINATION: ICD-10-CM

## 2021-09-23 DIAGNOSIS — E78.5 HYPERLIPIDEMIA, UNSPECIFIED HYPERLIPIDEMIA TYPE: ICD-10-CM

## 2021-09-23 PROCEDURE — 90686 IIV4 VACC NO PRSV 0.5 ML IM: CPT | Performed by: FAMILY MEDICINE

## 2021-09-23 PROCEDURE — 99214 OFFICE O/P EST MOD 30 MIN: CPT | Mod: 25 | Performed by: FAMILY MEDICINE

## 2021-09-23 PROCEDURE — 90471 IMMUNIZATION ADMIN: CPT | Performed by: FAMILY MEDICINE

## 2021-09-23 RX ORDER — AMLODIPINE BESYLATE 5 MG/1
5 TABLET ORAL DAILY
Qty: 90 TABLET | Refills: 3 | Status: SHIPPED | OUTPATIENT
Start: 2021-09-23 | End: 2022-09-11 | Stop reason: SDUPTHER

## 2021-09-23 ASSESSMENT — FIBROSIS 4 INDEX: FIB4 SCORE: 1.16

## 2021-09-23 NOTE — ASSESSMENT & PLAN NOTE
Chronic issue, asymptomatic.  Not checking home Bps.  No chest pain, palpitations, SOB, headaches, weakness or numbness.  Only has dizziness when lying flat and moves her head quickly

## 2022-03-24 ENCOUNTER — OFFICE VISIT (OUTPATIENT)
Dept: MEDICAL GROUP | Facility: MEDICAL CENTER | Age: 62
End: 2022-03-24
Payer: COMMERCIAL

## 2022-03-24 VITALS
TEMPERATURE: 97.3 F | BODY MASS INDEX: 35.03 KG/M2 | SYSTOLIC BLOOD PRESSURE: 114 MMHG | DIASTOLIC BLOOD PRESSURE: 74 MMHG | WEIGHT: 218 LBS | HEIGHT: 66 IN | OXYGEN SATURATION: 94 % | RESPIRATION RATE: 14 BRPM | HEART RATE: 66 BPM

## 2022-03-24 DIAGNOSIS — E78.5 HYPERLIPIDEMIA, UNSPECIFIED HYPERLIPIDEMIA TYPE: ICD-10-CM

## 2022-03-24 DIAGNOSIS — I10 ESSENTIAL HYPERTENSION: Primary | ICD-10-CM

## 2022-03-24 DIAGNOSIS — Z12.31 ENCOUNTER FOR SCREENING MAMMOGRAM FOR BREAST CANCER: ICD-10-CM

## 2022-03-24 DIAGNOSIS — M25.512 ACUTE PAIN OF LEFT SHOULDER: ICD-10-CM

## 2022-03-24 PROCEDURE — 99214 OFFICE O/P EST MOD 30 MIN: CPT | Performed by: FAMILY MEDICINE

## 2022-03-24 ASSESSMENT — PATIENT HEALTH QUESTIONNAIRE - PHQ9: CLINICAL INTERPRETATION OF PHQ2 SCORE: 0

## 2022-03-24 ASSESSMENT — FIBROSIS 4 INDEX: FIB4 SCORE: 1.16

## 2022-04-14 ENCOUNTER — HOSPITAL ENCOUNTER (OUTPATIENT)
Facility: MEDICAL CENTER | Age: 62
End: 2022-04-14
Attending: FAMILY MEDICINE
Payer: COMMERCIAL

## 2022-04-14 ENCOUNTER — HOSPITAL ENCOUNTER (OUTPATIENT)
Dept: LAB | Facility: MEDICAL CENTER | Age: 62
End: 2022-04-14
Attending: FAMILY MEDICINE
Payer: COMMERCIAL

## 2022-04-14 DIAGNOSIS — Z11.59 NEED FOR HEPATITIS C SCREENING TEST: ICD-10-CM

## 2022-04-14 DIAGNOSIS — E78.5 HYPERLIPIDEMIA, UNSPECIFIED HYPERLIPIDEMIA TYPE: ICD-10-CM

## 2022-04-14 DIAGNOSIS — Z00.00 HEALTHCARE MAINTENANCE: ICD-10-CM

## 2022-04-14 DIAGNOSIS — I10 ESSENTIAL HYPERTENSION: ICD-10-CM

## 2022-04-14 DIAGNOSIS — R73.9 HYPERGLYCEMIA: ICD-10-CM

## 2022-04-14 LAB
ALBUMIN SERPL BCP-MCNC: 4.8 G/DL (ref 3.2–4.9)
ALBUMIN/GLOB SERPL: 1.8 G/DL
ALP SERPL-CCNC: 61 U/L (ref 30–99)
ALT SERPL-CCNC: 27 U/L (ref 2–50)
ANION GAP SERPL CALC-SCNC: 10 MMOL/L (ref 7–16)
AST SERPL-CCNC: 22 U/L (ref 12–45)
BILIRUB SERPL-MCNC: 0.4 MG/DL (ref 0.1–1.5)
BUN SERPL-MCNC: 13 MG/DL (ref 8–22)
CALCIUM SERPL-MCNC: 9.5 MG/DL (ref 8.5–10.5)
CHLORIDE SERPL-SCNC: 103 MMOL/L (ref 96–112)
CHOLEST SERPL-MCNC: 249 MG/DL (ref 100–199)
CO2 SERPL-SCNC: 25 MMOL/L (ref 20–33)
CREAT SERPL-MCNC: 0.95 MG/DL (ref 0.5–1.4)
EST. AVERAGE GLUCOSE BLD GHB EST-MCNC: 108 MG/DL
FASTING STATUS PATIENT QL REPORTED: NORMAL
GFR SERPLBLD CREATININE-BSD FMLA CKD-EPI: 68 ML/MIN/1.73 M 2
GLOBULIN SER CALC-MCNC: 2.6 G/DL (ref 1.9–3.5)
GLUCOSE SERPL-MCNC: 101 MG/DL (ref 65–99)
HBA1C MFR BLD: 5.4 % (ref 4–5.6)
HCV AB SER QL: NORMAL
HDLC SERPL-MCNC: 54 MG/DL
LDLC SERPL CALC-MCNC: 175 MG/DL
POTASSIUM SERPL-SCNC: 4.6 MMOL/L (ref 3.6–5.5)
PROT SERPL-MCNC: 7.4 G/DL (ref 6–8.2)
SODIUM SERPL-SCNC: 138 MMOL/L (ref 135–145)
TRIGL SERPL-MCNC: 100 MG/DL (ref 0–149)

## 2022-04-14 PROCEDURE — G0472 HEP C SCREEN HIGH RISK/OTHER: HCPCS

## 2022-04-14 PROCEDURE — 80061 LIPID PANEL: CPT

## 2022-04-14 PROCEDURE — 83036 HEMOGLOBIN GLYCOSYLATED A1C: CPT

## 2022-04-14 PROCEDURE — 80053 COMPREHEN METABOLIC PANEL: CPT

## 2022-04-14 PROCEDURE — 82274 ASSAY TEST FOR BLOOD FECAL: CPT

## 2022-04-14 PROCEDURE — 36415 COLL VENOUS BLD VENIPUNCTURE: CPT

## 2022-04-15 DIAGNOSIS — Z00.00 HEALTHCARE MAINTENANCE: ICD-10-CM

## 2022-04-15 DIAGNOSIS — Z12.11 SCREEN FOR COLON CANCER: ICD-10-CM

## 2022-04-15 LAB — AMBIGUOUS DTTM AMBI4: NORMAL

## 2022-04-17 LAB — IMM ASSAY OCC BLD FITOB: NEGATIVE

## 2022-09-11 DIAGNOSIS — I10 HYPERTENSION, UNSPECIFIED TYPE: ICD-10-CM

## 2022-09-12 RX ORDER — AMLODIPINE BESYLATE 5 MG/1
5 TABLET ORAL DAILY
Qty: 90 TABLET | Refills: 3 | Status: SHIPPED | OUTPATIENT
Start: 2022-09-12 | End: 2023-05-04 | Stop reason: SDUPTHER

## 2022-09-30 ENCOUNTER — APPOINTMENT (OUTPATIENT)
Dept: RADIOLOGY | Facility: MEDICAL CENTER | Age: 62
End: 2022-09-30
Attending: FAMILY MEDICINE
Payer: COMMERCIAL

## 2022-09-30 DIAGNOSIS — Z12.31 ENCOUNTER FOR SCREENING MAMMOGRAM FOR BREAST CANCER: ICD-10-CM

## 2022-09-30 PROCEDURE — 77067 SCR MAMMO BI INCL CAD: CPT

## 2022-10-14 SDOH — ECONOMIC STABILITY: FOOD INSECURITY: WITHIN THE PAST 12 MONTHS, YOU WORRIED THAT YOUR FOOD WOULD RUN OUT BEFORE YOU GOT MONEY TO BUY MORE.: NEVER TRUE

## 2022-10-14 SDOH — ECONOMIC STABILITY: INCOME INSECURITY: HOW HARD IS IT FOR YOU TO PAY FOR THE VERY BASICS LIKE FOOD, HOUSING, MEDICAL CARE, AND HEATING?: NOT HARD AT ALL

## 2022-10-14 SDOH — HEALTH STABILITY: MENTAL HEALTH
STRESS IS WHEN SOMEONE FEELS TENSE, NERVOUS, ANXIOUS, OR CAN'T SLEEP AT NIGHT BECAUSE THEIR MIND IS TROUBLED. HOW STRESSED ARE YOU?: TO SOME EXTENT

## 2022-10-14 SDOH — ECONOMIC STABILITY: FOOD INSECURITY: WITHIN THE PAST 12 MONTHS, THE FOOD YOU BOUGHT JUST DIDN'T LAST AND YOU DIDN'T HAVE MONEY TO GET MORE.: NEVER TRUE

## 2022-10-14 SDOH — ECONOMIC STABILITY: HOUSING INSECURITY

## 2022-10-14 SDOH — HEALTH STABILITY: PHYSICAL HEALTH: ON AVERAGE, HOW MANY DAYS PER WEEK DO YOU ENGAGE IN MODERATE TO STRENUOUS EXERCISE (LIKE A BRISK WALK)?: 5 DAYS

## 2022-10-14 SDOH — HEALTH STABILITY: PHYSICAL HEALTH: ON AVERAGE, HOW MANY MINUTES DO YOU ENGAGE IN EXERCISE AT THIS LEVEL?: 60 MIN

## 2022-10-14 SDOH — ECONOMIC STABILITY: TRANSPORTATION INSECURITY
IN THE PAST 12 MONTHS, HAS LACK OF TRANSPORTATION KEPT YOU FROM MEETINGS, WORK, OR FROM GETTING THINGS NEEDED FOR DAILY LIVING?: NO

## 2022-10-14 SDOH — ECONOMIC STABILITY: INCOME INSECURITY: IN THE LAST 12 MONTHS, WAS THERE A TIME WHEN YOU WERE NOT ABLE TO PAY THE MORTGAGE OR RENT ON TIME?: NO

## 2022-10-14 ASSESSMENT — SOCIAL DETERMINANTS OF HEALTH (SDOH)
HOW OFTEN DO YOU HAVE A DRINK CONTAINING ALCOHOL: 2-3 TIMES A WEEK
HOW OFTEN DO YOU ATTENT MEETINGS OF THE CLUB OR ORGANIZATION YOU BELONG TO?: MORE THAN 4 TIMES PER YEAR
DO YOU BELONG TO ANY CLUBS OR ORGANIZATIONS SUCH AS CHURCH GROUPS UNIONS, FRATERNAL OR ATHLETIC GROUPS, OR SCHOOL GROUPS?: YES
HOW OFTEN DO YOU GET TOGETHER WITH FRIENDS OR RELATIVES?: TWICE A WEEK
DO YOU BELONG TO ANY CLUBS OR ORGANIZATIONS SUCH AS CHURCH GROUPS UNIONS, FRATERNAL OR ATHLETIC GROUPS, OR SCHOOL GROUPS?: YES
IN A TYPICAL WEEK, HOW MANY TIMES DO YOU TALK ON THE PHONE WITH FAMILY, FRIENDS, OR NEIGHBORS?: MORE THAN THREE TIMES A WEEK
HOW OFTEN DO YOU HAVE SIX OR MORE DRINKS ON ONE OCCASION: LESS THAN MONTHLY
HOW OFTEN DO YOU GET TOGETHER WITH FRIENDS OR RELATIVES?: TWICE A WEEK
HOW OFTEN DO YOU ATTEND CHURCH OR RELIGIOUS SERVICES?: PATIENT DECLINED
HOW OFTEN DO YOU ATTENT MEETINGS OF THE CLUB OR ORGANIZATION YOU BELONG TO?: MORE THAN 4 TIMES PER YEAR
WITHIN THE PAST 12 MONTHS, YOU WORRIED THAT YOUR FOOD WOULD RUN OUT BEFORE YOU GOT THE MONEY TO BUY MORE: NEVER TRUE
IN A TYPICAL WEEK, HOW MANY TIMES DO YOU TALK ON THE PHONE WITH FAMILY, FRIENDS, OR NEIGHBORS?: MORE THAN THREE TIMES A WEEK
HOW HARD IS IT FOR YOU TO PAY FOR THE VERY BASICS LIKE FOOD, HOUSING, MEDICAL CARE, AND HEATING?: NOT HARD AT ALL
HOW MANY DRINKS CONTAINING ALCOHOL DO YOU HAVE ON A TYPICAL DAY WHEN YOU ARE DRINKING: 1 OR 2
HOW OFTEN DO YOU ATTEND CHURCH OR RELIGIOUS SERVICES?: PATIENT DECLINED

## 2022-10-14 ASSESSMENT — LIFESTYLE VARIABLES
HOW MANY STANDARD DRINKS CONTAINING ALCOHOL DO YOU HAVE ON A TYPICAL DAY: 1 OR 2
SKIP TO QUESTIONS 9-10: 0
HOW OFTEN DO YOU HAVE A DRINK CONTAINING ALCOHOL: 2-3 TIMES A WEEK
AUDIT-C TOTAL SCORE: 4
HOW OFTEN DO YOU HAVE SIX OR MORE DRINKS ON ONE OCCASION: LESS THAN MONTHLY

## 2022-10-16 NOTE — PROGRESS NOTES
Subjective:     CC:   Chief Complaint   Patient presents with    Annual Exam       HPI:   Gabriela Mosquera is a 62 y.o. female who presents for annual exam    HTN- does not check her home Bps.    Patient has GYN provider: No   Last Pap Smear: 3/10/21  H/O Abnormal Pap: No  Last Mammogram: 9/30/22  Last Colorectal Cancer Screening: FIT 4/14/21 with plan for 1 year follow-up  Last Tdap: 2020    Exercise: regularly  Diet: well balanced    Postmenopausal- no period since her endometrial ablation >20 years ago  She has not utilized hormone replacement therapy.  Denies any menopausal symptoms.  No significant bloating/fluid retention, pelvic pain, or dyspareunia. No abnormal vaginal discharge.   No breast tenderness, mass, nipple discharge or changes in size or contour.    OB History   No obstetric history on file.      She  has no history on file for sexual activity.    She  has a past medical history of Acute pain of left shoulder (3/24/2022) and Bell's palsy.  She  has a past surgical history that includes open reduction (Right, 03/2020).    Family History   Problem Relation Age of Onset    Diabetes Mother     Hypertension Mother     Cancer Neg Hx     Stroke Neg Hx      Social History     Tobacco Use    Smoking status: Never    Smokeless tobacco: Never   Vaping Use    Vaping Use: Never used   Substance Use Topics    Alcohol use: Yes     Comment: 5 times a week, wine or liquor    Drug use: Never       Patient Active Problem List    Diagnosis Date Noted    Pelvic pain 03/10/2021    Allergic rhinitis 02/10/2021    Essential hypertension 12/23/2020    Hyperglycemia 12/23/2020    Hyperlipidemia 12/23/2020    Dizziness 12/09/2020    Tremor 12/09/2020    Left foot pain 12/09/2020    Obesity (BMI 30-39.9) 12/09/2020    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 90 Tablet 3     No current  "facility-administered medications for this visit.     Allergies   Allergen Reactions    Seasonal        Review of Systems   Constitutional: Negative for fever, chills and malaise/fatigue.   HENT: Negative for congestion and itchy eyes except when having season allergies. Symptoms resolve well with OTC.    Eyes: Negative for pain.   Respiratory: Negative for cough and shortness of breath.    Cardiovascular: Negative for chest pain and leg swelling.   Gastrointestinal: Negative for nausea, vomiting, abdominal pain and diarrhea.   Genitourinary: Negative for dysuria and hematuria.   Skin: Negative for rash.   Neurological: Negative for dizziness, focal weakness and headaches.   Endo/Heme/Allergies: Does not bruise/bleed easily.   Psychiatric/Behavioral: Negative for depression.  The patient is not nervous/anxious.      Objective:   /72 (BP Location: Left arm, Patient Position: Sitting, BP Cuff Size: Adult long)   Pulse 60   Temp 36.5 °C (97.7 °F) (Temporal)   Resp 18   Ht 1.676 m (5' 6\")   Wt 96.1 kg (211 lb 13.8 oz)   SpO2 95%   BMI 34.20 kg/m²     Physical Exam:  Constitutional: Well-developed and well-nourished. Not diaphoretic. No distress.   Skin: Skin is warm and dry. No rash noted.  Head: Atraumatic without lesions.  Eyes: Conjunctivae and extraocular motions are normal. Pupils are equal, round, and reactive to light. No scleral icterus.   Ears:  External ears unremarkable. Tympanic membranes clear and intact.  Nose/mouth/throat: nasal and OP examinations deferred given COVID19 exposure risk  Neck: Supple, trachea midline. Normal range of motion. No thyromegaly present. No lymphadenopathy  Cardiovascular: Regular rate and rhythm, S1 and S2 without murmur, rubs, or gallops.    Respiratory: Effort normal. Clear to auscultation throughout. No adventitious sounds.   Abdomen: Soft, non tender, and without distention. Active bowel sounds.   Extremities: No cyanosis, clubbing, erythema, nor edema. "   Musculoskeletal: All major joints AROM full in all directions without pain.  Neurological: Alert and oriented x 3. Grossly non-focal. Strength and sensation grossly intact.   Psychiatric:  Behavior, mood, and affect are appropriate.    Assessment and Plan:     1. Well woman exam (no gynecological exam)  HM per below  - COLOGUARD (FIT DNA)  - HEP B SURFACE AB; Future  - Comp Metabolic Panel; Future  - Lipid Profile; Future  - HEMOGLOBIN A1C; Future    2. Need for vaccination  Pt desires vaccination.  Risks and benefits discussed.  No contraindications today.  Vaccine given.  Follow-up precautions discussed.  - INFLUENZA VACCINE QUAD INJ (PF)    3. Screen for colon cancer  Asymptomatic.  Screening options discussed.  Pt would like to proceed with cologuard.    - COLOGUARD (FIT DNA)    4. Immunity status testing  Testing ordered with plan to provide booster if not immune  - HEP B SURFACE AB; Future    5. Hyperlipidemia, unspecified hyperlipidemia type  Asymptomatic.  Labs ordered. Continue dietary and exercise modifications.  - Lipid Profile; Future    6. Essential hypertension  Chronic, well controlled.  Asymptomatic.  Continue current medications.    7. Hyperglycemia  Asymptomatic.  Labs ordered. Continue dietary and exercise modifications.        Health maintenance:    Labs per orders  Immunizations per orders  Patient counseled about skin care, diet, supplements, and exercise.  Discussed  diet and exercise     Follow-up: No follow-ups on file.

## 2022-10-17 ENCOUNTER — OFFICE VISIT (OUTPATIENT)
Dept: MEDICAL GROUP | Facility: MEDICAL CENTER | Age: 62
End: 2022-10-17
Payer: COMMERCIAL

## 2022-10-17 VITALS
RESPIRATION RATE: 18 BRPM | BODY MASS INDEX: 34.05 KG/M2 | WEIGHT: 211.86 LBS | HEART RATE: 60 BPM | TEMPERATURE: 97.7 F | OXYGEN SATURATION: 95 % | SYSTOLIC BLOOD PRESSURE: 122 MMHG | DIASTOLIC BLOOD PRESSURE: 72 MMHG | HEIGHT: 66 IN

## 2022-10-17 DIAGNOSIS — Z01.84 IMMUNITY STATUS TESTING: ICD-10-CM

## 2022-10-17 DIAGNOSIS — E78.5 HYPERLIPIDEMIA, UNSPECIFIED HYPERLIPIDEMIA TYPE: ICD-10-CM

## 2022-10-17 DIAGNOSIS — I10 ESSENTIAL HYPERTENSION: ICD-10-CM

## 2022-10-17 DIAGNOSIS — Z23 NEED FOR VACCINATION: ICD-10-CM

## 2022-10-17 DIAGNOSIS — Z00.00 WELL WOMAN EXAM (NO GYNECOLOGICAL EXAM): ICD-10-CM

## 2022-10-17 DIAGNOSIS — R73.9 HYPERGLYCEMIA: ICD-10-CM

## 2022-10-17 DIAGNOSIS — Z12.11 SCREEN FOR COLON CANCER: ICD-10-CM

## 2022-10-17 PROCEDURE — 99396 PREV VISIT EST AGE 40-64: CPT | Mod: 25 | Performed by: FAMILY MEDICINE

## 2022-10-17 PROCEDURE — 90471 IMMUNIZATION ADMIN: CPT | Performed by: FAMILY MEDICINE

## 2022-10-17 PROCEDURE — 90686 IIV4 VACC NO PRSV 0.5 ML IM: CPT | Performed by: FAMILY MEDICINE

## 2022-10-17 ASSESSMENT — FIBROSIS 4 INDEX: FIB4 SCORE: 1.14

## 2022-11-01 DIAGNOSIS — R19.5 POSITIVE COLORECTAL CANCER SCREENING USING COLOGUARD TEST: ICD-10-CM

## 2022-11-02 ENCOUNTER — PATIENT MESSAGE (OUTPATIENT)
Dept: MEDICAL GROUP | Facility: MEDICAL CENTER | Age: 62
End: 2022-11-02
Payer: COMMERCIAL

## 2023-04-07 ENCOUNTER — HOSPITAL ENCOUNTER (OUTPATIENT)
Dept: LAB | Facility: MEDICAL CENTER | Age: 63
End: 2023-04-07
Attending: FAMILY MEDICINE
Payer: COMMERCIAL

## 2023-04-07 DIAGNOSIS — Z00.00 WELL WOMAN EXAM (NO GYNECOLOGICAL EXAM): ICD-10-CM

## 2023-04-07 DIAGNOSIS — E78.5 HYPERLIPIDEMIA, UNSPECIFIED HYPERLIPIDEMIA TYPE: ICD-10-CM

## 2023-04-07 DIAGNOSIS — Z01.84 IMMUNITY STATUS TESTING: ICD-10-CM

## 2023-04-07 LAB
ALBUMIN SERPL BCP-MCNC: 4.3 G/DL (ref 3.2–4.9)
ALBUMIN/GLOB SERPL: 1.6 G/DL
ALP SERPL-CCNC: 57 U/L (ref 30–99)
ALT SERPL-CCNC: 29 U/L (ref 2–50)
ANION GAP SERPL CALC-SCNC: 10 MMOL/L (ref 7–16)
AST SERPL-CCNC: 20 U/L (ref 12–45)
BILIRUB SERPL-MCNC: 0.4 MG/DL (ref 0.1–1.5)
BUN SERPL-MCNC: 11 MG/DL (ref 8–22)
CALCIUM ALBUM COR SERPL-MCNC: 9 MG/DL (ref 8.5–10.5)
CALCIUM SERPL-MCNC: 9.2 MG/DL (ref 8.5–10.5)
CHLORIDE SERPL-SCNC: 103 MMOL/L (ref 96–112)
CHOLEST SERPL-MCNC: 258 MG/DL (ref 100–199)
CO2 SERPL-SCNC: 25 MMOL/L (ref 20–33)
CREAT SERPL-MCNC: 0.85 MG/DL (ref 0.5–1.4)
EST. AVERAGE GLUCOSE BLD GHB EST-MCNC: 117 MG/DL
FASTING STATUS PATIENT QL REPORTED: NORMAL
GFR SERPLBLD CREATININE-BSD FMLA CKD-EPI: 78 ML/MIN/1.73 M 2
GLOBULIN SER CALC-MCNC: 2.7 G/DL (ref 1.9–3.5)
GLUCOSE SERPL-MCNC: 100 MG/DL (ref 65–99)
HBA1C MFR BLD: 5.7 % (ref 4–5.6)
HBV SURFACE AB SERPL IA-ACNC: <3.5 MIU/ML (ref 0–10)
HDLC SERPL-MCNC: 58 MG/DL
LDLC SERPL CALC-MCNC: 181 MG/DL
POTASSIUM SERPL-SCNC: 4.5 MMOL/L (ref 3.6–5.5)
PROT SERPL-MCNC: 7 G/DL (ref 6–8.2)
SODIUM SERPL-SCNC: 138 MMOL/L (ref 135–145)
TRIGL SERPL-MCNC: 95 MG/DL (ref 0–149)

## 2023-04-07 PROCEDURE — 36415 COLL VENOUS BLD VENIPUNCTURE: CPT

## 2023-04-07 PROCEDURE — 80061 LIPID PANEL: CPT

## 2023-04-07 PROCEDURE — 80053 COMPREHEN METABOLIC PANEL: CPT

## 2023-04-07 PROCEDURE — 83036 HEMOGLOBIN GLYCOSYLATED A1C: CPT

## 2023-04-07 PROCEDURE — 86706 HEP B SURFACE ANTIBODY: CPT

## 2023-05-04 ENCOUNTER — OFFICE VISIT (OUTPATIENT)
Dept: MEDICAL GROUP | Facility: MEDICAL CENTER | Age: 63
End: 2023-05-04
Payer: COMMERCIAL

## 2023-05-04 VITALS
DIASTOLIC BLOOD PRESSURE: 64 MMHG | SYSTOLIC BLOOD PRESSURE: 118 MMHG | WEIGHT: 211.64 LBS | BODY MASS INDEX: 34.01 KG/M2 | OXYGEN SATURATION: 94 % | HEIGHT: 66 IN | RESPIRATION RATE: 20 BRPM | HEART RATE: 66 BPM | TEMPERATURE: 97.6 F

## 2023-05-04 DIAGNOSIS — R20.2 TINGLING: ICD-10-CM

## 2023-05-04 DIAGNOSIS — E78.5 HYPERLIPIDEMIA, UNSPECIFIED HYPERLIPIDEMIA TYPE: Chronic | ICD-10-CM

## 2023-05-04 DIAGNOSIS — I10 PRIMARY HYPERTENSION: ICD-10-CM

## 2023-05-04 DIAGNOSIS — R73.9 HYPERGLYCEMIA: Chronic | ICD-10-CM

## 2023-05-04 DIAGNOSIS — R05.2 SUBACUTE COUGH: ICD-10-CM

## 2023-05-04 PROCEDURE — 99214 OFFICE O/P EST MOD 30 MIN: CPT | Performed by: BEHAVIOR ANALYST

## 2023-05-04 RX ORDER — AMLODIPINE BESYLATE 5 MG/1
5 TABLET ORAL DAILY
Qty: 90 TABLET | Refills: 3 | Status: SHIPPED | OUTPATIENT
Start: 2023-05-04

## 2023-05-04 ASSESSMENT — PATIENT HEALTH QUESTIONNAIRE - PHQ9: CLINICAL INTERPRETATION OF PHQ2 SCORE: 0

## 2023-05-04 NOTE — PATIENT INSTRUCTIONS
To lower LDL levels:   -  Increase exercise- both cardio and weight/strength training. At least 150 minutes a week.   - Eat more PLANTS. Increase high fiber foods such as oatmeal, fruits, and veggies. Increase good fats from fish, nuts such as almonds, avocados and olive oil.   - Psyllium fiber, in fiber supplement such as Metamucil, can be helpful at trapping and removing cholesterol in the digestive tract if taken daily.  Healthy Diet  -Mediterranean Diet or DASH Diet- rich in fruits, vegetables, nuts and healthy oils  -good rule of thumb for portions = size of the palm of your hand  -healthy plate = tells you how to build a healthy meal    Exercise  -150 minutes of moderate aerobic activity per week OR 75 minutes of vigorous aerobic activity per week  +  -2 days per week of strength    Fat-burning exercise  -You want to be able to talk but not sing while doing the exercise  -Heart rate zone = 60-70% of max heart rate    Calculate Heart Rate  -Max HR = 220 - age  -Fat burning zone = 0.6 x max HR --- 0.7 x max HR     Hydration:   - Drink mainly water  - Avoiding sugary beverages    Sleep Hygiene:  - Allowing 7-8 hrs of overnight sleep  -Avoid naps.  Napping during the day can disturb the normal pattern of sleep and wakefulness.  -Avoid stimulants, such as caffeine and nicotine, and alcohol as bedtime approaches.  While alcohol is well known to speed the onset of sleep, the process of the body metabolizing the alcohol can cause arousal, thus disrupting sleep.  -Exercise.  All forms of exercise help to ensure sound sleep.  Vigorous activity should be conducted in the morning or late afternoon, while a relaxing exercise, like yoga, can be done before bed to help initiate a restful night sleep.  -Avoid foods too close to bedtime-particularly large meals and chocolate (which contains caffeine).  And try not to make any significant change to your diet.  For example, if you are struggling with sleep problem, is not a good  time to start experimenting with spicy dishes.  -Soak up some natural light.  This is particularly important for older people who may not venture outside as frequently as children in younger adults.  Light exposure helps maintain a healthy sleep-wake cycle.  -Establish a regular bedtime routine.  Try to avoid emotionally upsetting conversations and activities before going to sleep.  -Associated bed with sleep.  Is not a good idea to watch television, use her computer or phone, listen to the radio, or read while in bed.  -Ensure a pleasant, relaxing sleep environment.  The bed should be comfortable, in the room should not be too hot, cold, or bright.  - Avoid bedroom clock or phone. Avoid checking the time at night. This includes alarm clocks and other time pieces (eg, watches and smart phones). Checking the time increases cognitive arousal and prolongs wakefulness.      Journaling  - Good for mental health  -The best way to keep track of calories in and calories burned by exercise

## 2023-05-04 NOTE — LETTER
[facility]  ARAM Hernandez.  [address]  [facility] [address]  Fax: [phone]   Authorization for Release/Disclosure of   Protected Health Information   Name: GABRIELA MOSQUERA : 1960 SSN: [id number]   Address: [address] [place] [id number] Phone:    [phone] (home)    I authorize the entity listed below to release/disclose the PHI below to:   [facility]/KEI Hernandez and KEI Hernandez   Provider or Entity Name:     Address   City, State, Zip   Phone:      Fax:     Reason for request: continuity of care   Information to be released:    [  ] LAST COLONOSCOPY,  including any PATH REPORT and follow-up  [  ] LAST FIT/COLOGUARD RESULT [  ] LAST DEXA  [  ] LAST MAMMOGRAM  [  ] LAST PAP  [  ] LAST LABS [  ] RETINA EXAM REPORT  [  ] IMMUNIZATION RECORDS  [  ] Release all info      [  ] Check here and initial the line next to each item to release ALL health information INCLUDING  _____ Care and treatment for drug and / or alcohol abuse  _____ HIV testing, infection status, or AIDS  _____ Genetic TestingDATES OF SERVICE OR TIME PERIOD TO BE DISCLOSED:   I understand and acknowledge that:  * This Authorization may be revoked at any time by you in writing, except if your health information has already been used or disclosed.  * Your health information that will be used or disclosed as a result of you signing this authorization could be re-disclosed by the recipient. If this occurs, your re-disclosed health information may no longer be protected by State or Federal laws.  * You may refuse to sign this Authorization. Your refusal will not affect your ability to obtain treatment.  * This Authorization becomes effective upon signing and will  on (date) __________.      If no date is indicated, this Authorization will  one (1) year from the signature date.    Name: Gabriela Mosquera  Signature: Date:   2023     PLEASE  FAX REQUESTED RECORDS BACK TO: [phone]

## 2023-05-04 NOTE — PROGRESS NOTES
Chief Complaint   Patient presents with    Establish Care    Cough     X 2 weeks with tingling in mouth        Patient is a 62 y.o. female here to establish care and discuss a cough and tingling in the mouth.  Her prior PCP was Dr. Beard.     Problem   Subacute Cough    Reporting 14 days of dry cough. No fever, no sinus congestion, no sore throat.  She denies admit to wheezing at the start of the cough but not recently.  She does have allergies- and uses eye drops.  She has tried using nasal corticosteroids in the past but this caused nosebleeds.  Similarly ill exposures: no.  Treatments tried: none  Denies sour or acid taste in mouth. Denies indigestion or burping.   Does not take any supplements.  Possibly things taste different.       Tingling    Also about two week ago tingling in her mouth, gums and tongue. Mild sx are constant but worse when brushing teeth or using mouth wash.  Denies tingling of lips. Denies numbness.Denies teeth pain or infection. Has been a while since she went to the dentist.        Essential Hypertension    Taking amlodipine 5mg daily. She states this is working well for her.      Hyperglycemia    Lab Results   Component Value Date/Time    HBA1C 5.7 (H) 04/07/2023 0917    AVGLUC 117 04/07/2023 0917          Hyperlipidemia    Lab Results   Component Value Date/Time    CHOLSTRLTOT 258 (H) 04/07/2023 09:17 AM    HDL 58 04/07/2023 09:17 AM     (H) 04/07/2023 09:17 AM     Eating red meat about once per week. She like the occasional baked good and admits to eating too much butter.     She does swimming aerobics 5x a week for one hour a day.          She  reports that she has never smoked. She has never used smokeless tobacco..     ROS:  No fever, nausea, changes in bowel movements or skin rash.      I reviewed the patient's medications, allergies and medical history:  Current Outpatient Medications   Medication Sig Dispense Refill    amLODIPine (NORVASC) 5 MG Tab Take 1 Tablet by mouth  "every day. 90 Tablet 3     No current facility-administered medications for this visit.     Seasonal  Past Medical History:   Diagnosis Date    Acute pain of left shoulder 03/24/2022    Allergy     Bell's palsy     Hypertension         EXAM:  /64 (BP Location: Right arm, Patient Position: Sitting, BP Cuff Size: Adult long)   Pulse 66   Temp 36.4 °C (97.6 °F) (Temporal)   Resp 20   Ht 1.676 m (5' 6\")   Wt 96 kg (211 lb 10.3 oz)   SpO2 94%   General: Alert, no conversational dyspnea or audible wheeze, non-toxic appearance.  Eyes: PERRL, conjunctiva slightly injected, no eye discharge.  Ears: Normal pinnae,TM's normal bilaterally.  Nares: Patent with no mucus.  Sinuses: non tender over maxillary / frontal sinuses.  Throat: No erythematous injection , no exudate.  Mallampati score 4  Lungs: Clear to auscultation bilaterally, no wheeze, crackles or rhonchi.   Heart: Regular rate without murmur.  Skin: Warm and dry without rash.     ASSESSMENT:   1. Hypertension, unspecified type    2. Essential hypertension    3. Hyperlipidemia, unspecified hyperlipidemia type    4. Hyperglycemia    5. Subacute cough    6. Tingling        1.  Primary hypertension  Chronic, controlled.  Blood pressure is at goal under 130/80 in the office today.    -Continue dietary and lifestyle modifications.   Medications to continue:   - amLODIPine (NORVASC) 5 MG Tab; Take 1 Tablet by mouth every day.  Dispense: 90 Tablet; Refill: 3    2. Hyperlipidemia, unspecified hyperlipidemia type  -Chronic, moderately severe elevation in LDL at 181. The 10-year ASCVD risk score (Divya YANG, et al., 2019) is: 3.6%  -No need for statin at this time as LDL is less than 190 and ASCVD risk is low.  -Had extensive conversation with patient regarding dietary and exercise modifications to reduce cholesterol.  She is motivated to change her diet and specifically reduce butter intake.    4. Hyperglycemia  - EDUCATION AND COUNSELING:  -Exercise: At least 150 " minutes of moderate aerobic activity per week OR 75 minutes of vigorous aerobic activity per week, plus 2 days per week of strength training.    -Healthy lifestyle and eating habits: Mediterranean based diet (rich in fruits, vegetables, nuts and healthy oils); proper hydration and avoiding sugary beverages; adequate sleep hygiene (allowing 7-8 hrs of overnight sleep).    5.  Acute cough  -New problem to provider.  Possible differentials discussed with the patient.  Considering patient did have wheezing in the beginning could be reactive airway condition.  -Recommended an albuterol inhaler.  Patient declines for now.  She says her insurance is not good and this would be expensive.  -She states that its getting better slowly anyway.  - - Recommended Twice daily nasal saline irrigation.    6. Tingling in mouth  -New problem to this provider.  Uncertain etiology.  Recommended evaluation with dentistry.  Exam is unremarkable.  -Recommend that she try a women's multivitamin once daily.   -Offered to order further lab testing including B vitamin.  Patient declines for now.      Follow-up in 1 year or sooner if symptoms do not improve.

## 2024-06-16 DIAGNOSIS — I10 PRIMARY HYPERTENSION: ICD-10-CM

## 2024-06-17 NOTE — TELEPHONE ENCOUNTER
Was the patient seen in the last year in this department? Yes   Does patient have an active prescription for medications requested? No   Received Request Via:

## 2024-06-18 RX ORDER — AMLODIPINE BESYLATE 5 MG/1
5 TABLET ORAL DAILY
Qty: 60 TABLET | Refills: 0 | Status: SHIPPED | OUTPATIENT
Start: 2024-06-18

## 2024-08-19 DIAGNOSIS — I10 PRIMARY HYPERTENSION: ICD-10-CM

## 2024-08-19 RX ORDER — AMLODIPINE BESYLATE 5 MG/1
5 TABLET ORAL DAILY
Qty: 30 TABLET | Refills: 0 | Status: SHIPPED | OUTPATIENT
Start: 2024-08-19

## 2024-09-17 DIAGNOSIS — I10 PRIMARY HYPERTENSION: ICD-10-CM

## 2024-09-17 NOTE — TELEPHONE ENCOUNTER
Received request via: Pharmacy    Was the patient seen in the last year in this department? No, sent patient a message via My Chart to schedule an appointment.    Does the patient have an active prescription (recently filled or refills available) for medication(s) requested? No    Does the patient have halfway Plus and need 100 day supply (blood pressure, diabetes and cholesterol meds only)? Patient does not have SCP

## 2024-09-19 RX ORDER — AMLODIPINE BESYLATE 5 MG/1
5 TABLET ORAL DAILY
Qty: 30 TABLET | Refills: 0 | Status: SHIPPED | OUTPATIENT
Start: 2024-09-19

## 2024-10-18 DIAGNOSIS — I10 PRIMARY HYPERTENSION: ICD-10-CM

## 2024-10-18 RX ORDER — AMLODIPINE BESYLATE 5 MG/1
TABLET ORAL
Qty: 60 TABLET | Refills: 0 | Status: SHIPPED | OUTPATIENT
Start: 2024-10-18 | End: 2024-10-28 | Stop reason: SDUPTHER

## 2024-10-27 SDOH — HEALTH STABILITY: PHYSICAL HEALTH: ON AVERAGE, HOW MANY DAYS PER WEEK DO YOU ENGAGE IN MODERATE TO STRENUOUS EXERCISE (LIKE A BRISK WALK)?: 4 DAYS

## 2024-10-27 SDOH — ECONOMIC STABILITY: INCOME INSECURITY: HOW HARD IS IT FOR YOU TO PAY FOR THE VERY BASICS LIKE FOOD, HOUSING, MEDICAL CARE, AND HEATING?: NOT HARD AT ALL

## 2024-10-27 SDOH — ECONOMIC STABILITY: FOOD INSECURITY: WITHIN THE PAST 12 MONTHS, YOU WORRIED THAT YOUR FOOD WOULD RUN OUT BEFORE YOU GOT MONEY TO BUY MORE.: NEVER TRUE

## 2024-10-27 SDOH — ECONOMIC STABILITY: INCOME INSECURITY: IN THE LAST 12 MONTHS, WAS THERE A TIME WHEN YOU WERE NOT ABLE TO PAY THE MORTGAGE OR RENT ON TIME?: NO

## 2024-10-27 SDOH — ECONOMIC STABILITY: FOOD INSECURITY: WITHIN THE PAST 12 MONTHS, THE FOOD YOU BOUGHT JUST DIDN'T LAST AND YOU DIDN'T HAVE MONEY TO GET MORE.: NEVER TRUE

## 2024-10-27 SDOH — HEALTH STABILITY: PHYSICAL HEALTH: ON AVERAGE, HOW MANY MINUTES DO YOU ENGAGE IN EXERCISE AT THIS LEVEL?: 60 MIN

## 2024-10-27 ASSESSMENT — SOCIAL DETERMINANTS OF HEALTH (SDOH)
WITHIN THE PAST 12 MONTHS, YOU WORRIED THAT YOUR FOOD WOULD RUN OUT BEFORE YOU GOT THE MONEY TO BUY MORE: NEVER TRUE
DO YOU BELONG TO ANY CLUBS OR ORGANIZATIONS SUCH AS CHURCH GROUPS UNIONS, FRATERNAL OR ATHLETIC GROUPS, OR SCHOOL GROUPS?: YES
DO YOU BELONG TO ANY CLUBS OR ORGANIZATIONS SUCH AS CHURCH GROUPS UNIONS, FRATERNAL OR ATHLETIC GROUPS, OR SCHOOL GROUPS?: YES
HOW MANY DRINKS CONTAINING ALCOHOL DO YOU HAVE ON A TYPICAL DAY WHEN YOU ARE DRINKING: 1 OR 2
HOW OFTEN DO YOU ATTENT MEETINGS OF THE CLUB OR ORGANIZATION YOU BELONG TO?: MORE THAN 4 TIMES PER YEAR
HOW OFTEN DO YOU HAVE A DRINK CONTAINING ALCOHOL: 4 OR MORE TIMES A WEEK
HOW OFTEN DO YOU GET TOGETHER WITH FRIENDS OR RELATIVES?: MORE THAN THREE TIMES A WEEK
HOW OFTEN DO YOU GET TOGETHER WITH FRIENDS OR RELATIVES?: MORE THAN THREE TIMES A WEEK
IN THE PAST 12 MONTHS, HAS THE ELECTRIC, GAS, OIL, OR WATER COMPANY THREATENED TO SHUT OFF SERVICE IN YOUR HOME?: NO
IN A TYPICAL WEEK, HOW MANY TIMES DO YOU TALK ON THE PHONE WITH FAMILY, FRIENDS, OR NEIGHBORS?: MORE THAN THREE TIMES A WEEK
HOW OFTEN DO YOU ATTENT MEETINGS OF THE CLUB OR ORGANIZATION YOU BELONG TO?: MORE THAN 4 TIMES PER YEAR
HOW OFTEN DO YOU HAVE SIX OR MORE DRINKS ON ONE OCCASION: NEVER
HOW OFTEN DO YOU ATTEND CHURCH OR RELIGIOUS SERVICES?: PATIENT DECLINED
HOW HARD IS IT FOR YOU TO PAY FOR THE VERY BASICS LIKE FOOD, HOUSING, MEDICAL CARE, AND HEATING?: NOT HARD AT ALL
IN A TYPICAL WEEK, HOW MANY TIMES DO YOU TALK ON THE PHONE WITH FAMILY, FRIENDS, OR NEIGHBORS?: MORE THAN THREE TIMES A WEEK
HOW OFTEN DO YOU ATTEND CHURCH OR RELIGIOUS SERVICES?: PATIENT DECLINED

## 2024-10-27 ASSESSMENT — LIFESTYLE VARIABLES
SKIP TO QUESTIONS 9-10: 1
AUDIT-C TOTAL SCORE: 4
HOW OFTEN DO YOU HAVE A DRINK CONTAINING ALCOHOL: 4 OR MORE TIMES A WEEK
HOW OFTEN DO YOU HAVE SIX OR MORE DRINKS ON ONE OCCASION: NEVER
HOW MANY STANDARD DRINKS CONTAINING ALCOHOL DO YOU HAVE ON A TYPICAL DAY: 1 OR 2

## 2024-10-28 ENCOUNTER — OFFICE VISIT (OUTPATIENT)
Dept: MEDICAL GROUP | Facility: MEDICAL CENTER | Age: 64
End: 2024-10-28
Payer: COMMERCIAL

## 2024-10-28 VITALS
OXYGEN SATURATION: 96 % | HEART RATE: 80 BPM | BODY MASS INDEX: 36.02 KG/M2 | SYSTOLIC BLOOD PRESSURE: 128 MMHG | TEMPERATURE: 97.8 F | DIASTOLIC BLOOD PRESSURE: 80 MMHG | HEIGHT: 66 IN | WEIGHT: 224.1 LBS

## 2024-10-28 DIAGNOSIS — Z11.4 ENCOUNTER FOR SCREENING FOR HIV: ICD-10-CM

## 2024-10-28 DIAGNOSIS — Z12.31 ENCOUNTER FOR SCREENING MAMMOGRAM FOR MALIGNANT NEOPLASM OF BREAST: ICD-10-CM

## 2024-10-28 DIAGNOSIS — R73.03 PREDIABETES: ICD-10-CM

## 2024-10-28 DIAGNOSIS — H81.10 BPPV (BENIGN PAROXYSMAL POSITIONAL VERTIGO), UNSPECIFIED LATERALITY: ICD-10-CM

## 2024-10-28 DIAGNOSIS — L81.9 ATYPICAL PIGMENTED SKIN LESION: ICD-10-CM

## 2024-10-28 DIAGNOSIS — Z12.83 SKIN CANCER SCREENING: ICD-10-CM

## 2024-10-28 DIAGNOSIS — I10 PRIMARY HYPERTENSION: Chronic | ICD-10-CM

## 2024-10-28 DIAGNOSIS — Z00.00 WELLNESS EXAMINATION: Primary | ICD-10-CM

## 2024-10-28 DIAGNOSIS — E78.5 HYPERLIPIDEMIA, UNSPECIFIED HYPERLIPIDEMIA TYPE: Chronic | ICD-10-CM

## 2024-10-28 DIAGNOSIS — E66.01 SEVERE OBESITY (HCC): ICD-10-CM

## 2024-10-28 PROBLEM — R05.2 SUBACUTE COUGH: Chronic | Status: RESOLVED | Noted: 2023-05-04 | Resolved: 2024-10-28

## 2024-10-28 PROCEDURE — 99396 PREV VISIT EST AGE 40-64: CPT | Performed by: BEHAVIOR ANALYST

## 2024-10-28 PROCEDURE — 3079F DIAST BP 80-89 MM HG: CPT | Performed by: BEHAVIOR ANALYST

## 2024-10-28 PROCEDURE — 3074F SYST BP LT 130 MM HG: CPT | Performed by: BEHAVIOR ANALYST

## 2024-10-28 RX ORDER — AMLODIPINE BESYLATE 5 MG/1
5 TABLET ORAL DAILY
Qty: 90 TABLET | Refills: 3 | Status: SHIPPED | OUTPATIENT
Start: 2024-10-28

## 2024-10-28 ASSESSMENT — PATIENT HEALTH QUESTIONNAIRE - PHQ9: CLINICAL INTERPRETATION OF PHQ2 SCORE: 0

## 2025-01-29 DIAGNOSIS — I10 PRIMARY HYPERTENSION: Chronic | ICD-10-CM

## 2025-01-29 NOTE — TELEPHONE ENCOUNTER
Received request via: Patient    Was the patient seen in the last year in this department? Yes    Does the patient have an active prescription (recently filled or refills available) for medication(s) requested? Yes.     Please advise, pt received 360 day supply on 10/28/24

## 2025-02-03 RX ORDER — AMLODIPINE BESYLATE 5 MG/1
5 TABLET ORAL DAILY
Qty: 90 TABLET | Refills: 3 | Status: SHIPPED | OUTPATIENT
Start: 2025-02-03

## 2025-04-10 ENCOUNTER — OFFICE VISIT (OUTPATIENT)
Dept: MEDICAL GROUP | Facility: IMAGING CENTER | Age: 65
End: 2025-04-10
Payer: COMMERCIAL

## 2025-04-10 VITALS
SYSTOLIC BLOOD PRESSURE: 110 MMHG | DIASTOLIC BLOOD PRESSURE: 76 MMHG | OXYGEN SATURATION: 96 % | RESPIRATION RATE: 14 BRPM | TEMPERATURE: 98.6 F | HEART RATE: 68 BPM | HEIGHT: 66 IN | BODY MASS INDEX: 35.07 KG/M2 | WEIGHT: 218.2 LBS

## 2025-04-10 DIAGNOSIS — E78.5 HYPERLIPIDEMIA, UNSPECIFIED HYPERLIPIDEMIA TYPE: ICD-10-CM

## 2025-04-10 DIAGNOSIS — Z23 NEED FOR PNEUMOCOCCAL VACCINATION: ICD-10-CM

## 2025-04-10 DIAGNOSIS — L98.9 SKIN LESION OF FACE: ICD-10-CM

## 2025-04-10 DIAGNOSIS — Z13.0 SCREENING FOR DEFICIENCY ANEMIA: ICD-10-CM

## 2025-04-10 DIAGNOSIS — Z13.29 SCREENING FOR ENDOCRINE DISORDER: ICD-10-CM

## 2025-04-10 DIAGNOSIS — I10 PRIMARY HYPERTENSION: ICD-10-CM

## 2025-04-10 DIAGNOSIS — R73.03 PREDIABETES: ICD-10-CM

## 2025-04-10 DIAGNOSIS — Z83.3 FAMILY HISTORY OF DIABETES MELLITUS: ICD-10-CM

## 2025-04-10 DIAGNOSIS — Z12.31 ENCOUNTER FOR SCREENING MAMMOGRAM FOR MALIGNANT NEOPLASM OF BREAST: ICD-10-CM

## 2025-04-10 DIAGNOSIS — Z13.21 ENCOUNTER FOR VITAMIN DEFICIENCY SCREENING: ICD-10-CM

## 2025-04-10 DIAGNOSIS — Z11.4 SCREENING FOR HIV (HUMAN IMMUNODEFICIENCY VIRUS): ICD-10-CM

## 2025-04-10 PROCEDURE — 90471 IMMUNIZATION ADMIN: CPT | Performed by: FAMILY MEDICINE

## 2025-04-10 PROCEDURE — 3074F SYST BP LT 130 MM HG: CPT | Performed by: FAMILY MEDICINE

## 2025-04-10 PROCEDURE — 3078F DIAST BP <80 MM HG: CPT | Performed by: FAMILY MEDICINE

## 2025-04-10 PROCEDURE — 90677 PCV20 VACCINE IM: CPT | Performed by: FAMILY MEDICINE

## 2025-04-10 PROCEDURE — 99214 OFFICE O/P EST MOD 30 MIN: CPT | Mod: 25 | Performed by: FAMILY MEDICINE

## 2025-04-10 RX ORDER — AMLODIPINE BESYLATE 5 MG/1
5 TABLET ORAL DAILY
Qty: 90 TABLET | Refills: 0 | Status: SHIPPED | OUTPATIENT
Start: 2025-04-10

## 2025-04-10 ASSESSMENT — PATIENT HEALTH QUESTIONNAIRE - PHQ9: CLINICAL INTERPRETATION OF PHQ2 SCORE: 0

## 2025-04-10 NOTE — PROGRESS NOTES
Chief Complaint   Patient presents with    Establish Care     Ref. By  Esteban Mosquera        HPI:  64 y.o. female new patient here to review medical issues and establish care.  She was referred by her  who is also seen in our office.      Hypertension-she is on amlodipine 5 mg, not regularly checking her blood pressure.  Notes she may occasionally have slight dizziness with change in position from lying to standing.     Hyperlipidemia-she has declined statin therapy in the past.  Has not had any significant changes in her diet and lifestyle.    Prediabetes-A1c 5.7%  Mother had diabetes, father had diabetes.     Dermatology-last saw about 5 years ago.  She did have the lesion on her forehead previously evaluated.  States area may flake.  No scabbing or bleeding.    Lifestyle:  Diet: vegetables, read meat, chicken/fish. Cheese/butter. Cooks in olive oil. Yogurt. Not much processed.   Exercise/Activities: swims 4 times a week, 1 hour  Stressors: doing fine  Social Support: good support  Work: part time job     Health Maintenance:   Last pap: no gynecologist, 2021, negative, hpv high risk negative.  Reviewed recommendations for routine GYN exam and Pap smear.  Recommend patient schedule in future.  Immunizations: reviewed  Mammogram: due  Colonoscopy: 2023      Health Maintenance Due   Topic Date Due    HIV Screening  Never done    Pneumococcal Vaccine: 50+ Years (1 of 1 - PCV) Never done    Mammogram  09/30/2023       Immunization History   Administered Date(s) Administered    COVID-19, mRNA, LNP-S, PF, aamir-sucrose, 30 mcg/0.3 mL 01/19/2024, 10/21/2024    Influenza Vaccine Quad Inj (Pf) 09/23/2021, 10/17/2022    Influenza Vaccine Quad Recombinant 10/25/2020    Influenza split virus trivalent (PF) 10/21/2024    PFIZER BIVALENT SARS-COV-2 VACCINE (12+) 11/06/2022    PFIZER LAIRD CAP SARS-COV-2 VACCINATION (12+) 06/30/2022    PFIZER PURPLE CAP SARS-COV-2 VACCINATION (12+) 03/17/2021, 04/08/2021,  11/13/2021    Tdap Vaccine 12/23/2020    Zoster Vaccine Recombinant (RZV) (SHINGRIX) 10/24/2019, 12/30/2019         Review of Systems   Constitutional: Negative for fever, chills and malaise/fatigue.   HENT: Negative for congestion, sore throat, or swallowing issues.   Eyes: Negative for pain or vision changes.   Respiratory: Negative for cough and shortness of breath.    Cardiovascular: Negative for leg swelling. No chest pain.   Gastrointestinal: Negative for nausea, vomiting, abdominal pain and diarrhea.   Genitourinary: Negative for dysuria and hematuria.   Skin: Negative for rash.   Neurological: Negative for dizziness, focal weakness and headaches.   Endo/Heme/Allergies: Does not bruise/bleed easily.   Psychiatric/Behavioral: Negative for depression.  The patient is not nervous/anxious.          Current Outpatient Medications:     amLODIPine (NORVASC) 5 MG Tab, Take 1 Tablet by mouth every day., Disp: 90 Tablet, Rfl: 3    Allergies   Allergen Reactions    Seasonal        Patient Active Problem List   Diagnosis    Herniation of rectum into vagina    BPPV (benign paroxysmal positional vertigo), unspecified laterality    Tremor    Left foot pain    Body mass index (BMI) of 36.0-36.9 in adult    Primary hypertension    Prediabetes    Hyperlipidemia    Allergic rhinitis    Pelvic pain    Tingling    Severe obesity (HCC)       Past Medical History:   Diagnosis Date    Acute pain of left shoulder 03/24/2022    Allergy     Bell's palsy     Hypertension        Past Surgical History:   Procedure Laterality Date    OPEN REDUCTION Right 03/2020    facture repair of ankle       Family History   Problem Relation Age of Onset    Diabetes Mother     Hypertension Mother     Diabetes Father         Adult onset    Cancer Neg Hx     Stroke Neg Hx     Breast Cancer Neg Hx        Social History     Tobacco Use    Smoking status: Never    Smokeless tobacco: Never   Vaping Use    Vaping status: Never Used   Substance Use Topics     "Alcohol use: Yes     Alcohol/week: 0.6 oz     Types: 1 Glasses of wine per week     Comment: 3-4 times a week    Drug use: Never         PHYSICAL EXAM:  /76 (BP Location: Left arm, Patient Position: Sitting, BP Cuff Size: Adult)   Pulse 68   Temp 37 °C (98.6 °F) (Temporal)   Resp 14   Ht 1.676 m (5' 6\")   Wt 99 kg (218 lb 3.2 oz)   SpO2 96%   BMI 35.22 kg/m²   Constitutional: She appears well-developed and well-nourished. She appears not diaphoretic. No distress.   HENT: Right Ear: External ear normal. Left Ear: External ear normal. Tympanic membranes clear and intact.   Nose: Nose normal.   Mouth/Throat: Oropharynx is clear and moist. No oropharyngeal exudate.     Eyes: Conjunctivae and extraocular motions are normal. Pupils are equal, round, and reactive to light. No scleral icterus.   Neck: Normal range of motion. Neck supple. No thyromegaly present.   Cardiovascular: Normal rate, regular rhythm, normal heart sounds and intact distal pulses.  Exam reveals no gallop and no friction rub.  No murmur heard. No carotid bruits.   Pulmonary/Chest: Effort normal and breath sounds normal. No respiratory distress. She has no wheezes. She has no rales.   Abdominal: Soft. Bowel sounds are normal. She exhibits no distension and no mass. No tenderness. She has no rebound and no guarding.   Lymphadenopathy:  She has no cervical adenopathy.   Neurological: She is alert. She has normal reflexes. No cranial nerve deficit. She exhibits normal muscle tone.   Skin: Skin is warm and dry. She is not diaphoretic. 2 patchy slight pink dry areas on forehead,~1.5 and ~2 cm.   Psychiatric: She has a normal mood and affect. Her behavior is normal.   Musculoskeletal: She exhibits no edema. Full strength throughout. 2+ DTR throughout.       ASSESSMENT/PLAN:    This is a 64 y.o. female new patient.     1. Primary hypertension -appears stable, however notes occasional positional dizziness.   Recommend keep blood pressure log.  " Patient to continue amlodipine 5 mg daily at this time.  Would benefit from adequate hydration can sideration of compression stockings. CT-CARDIAC SCORING    CBC WITH DIFFERENTIAL    Comp Metabolic Panel    VITAMIN D,25 HYDROXY (DEFICIENCY)    amLODIPine (NORVASC) 5 MG Tab      2. Hyperlipidemia, unspecified hyperlipidemia type-elevated LDL.  Patient has declined medication therapy in the past.  Recommend further imaging and lab evaluation.  Recommend low-cholesterol, high-fiber diet and routine exercise.  May consider fiber supplements and other OTC supplements. CT-CARDIAC SCORING    Referral to Genetic Research Studies    Lipid Profile    HEMOGLOBIN A1C    TSH WITH REFLEX TO FT4     3. Family history of diabetes mellitus        4. Prediabetes   Recommend heart healthy/low cholesterol/high fiber/low sugar/low processed food diet and routine exercise.  Monitor labs. HEMOGLOBIN A1C      5. Encounter for screening mammogram for malignant neoplasm of breast  MA-SCREENING MAMMO BILAT W/TOMOSYNTHESIS W/CAD      6. Skin lesion of face -patient is fair skinned.  Appears to have persistent pink lesions of forehead for which she will need further evaluation.  Referral to dermatology. Referral to Dermatology      7. Screening for HIV (human immunodeficiency virus)  HIV AG/AB COMBO ASSAY SCREENING      8. Screening for deficiency anemia  CBC WITH DIFFERENTIAL      9. Screening for endocrine disorder  TSH WITH REFLEX TO FT4      10. Encounter for vitamin deficiency screening  VITAMIN D,25 HYDROXY (DEFICIENCY)      11. Need for pneumococcal vaccination  Pneumococcal Conjugate Vaccine 20-Valent (6 wks+)      Reviewed recommendations for vaccination and lab evaluation.  Plan to complete lab prior to follow-up visit.    Follow-up in 4 to 6 weeks.      This medical record contains text that has been entered with the assistance of computer voice recognition and dictation software.  Therefore, it may contain unintended errors in text,  spelling, punctuation, or grammar.

## 2025-04-11 PROBLEM — Z83.3 FAMILY HISTORY OF DIABETES MELLITUS: Status: ACTIVE | Noted: 2025-04-11

## 2025-05-01 ENCOUNTER — APPOINTMENT (OUTPATIENT)
Dept: RADIOLOGY | Facility: MEDICAL CENTER | Age: 65
End: 2025-05-01
Attending: FAMILY MEDICINE
Payer: COMMERCIAL

## 2025-05-01 DIAGNOSIS — I10 PRIMARY HYPERTENSION: ICD-10-CM

## 2025-05-01 DIAGNOSIS — E78.5 HYPERLIPIDEMIA, UNSPECIFIED HYPERLIPIDEMIA TYPE: ICD-10-CM

## 2025-05-01 DIAGNOSIS — Z12.31 ENCOUNTER FOR SCREENING MAMMOGRAM FOR MALIGNANT NEOPLASM OF BREAST: ICD-10-CM

## 2025-05-01 PROCEDURE — 77067 SCR MAMMO BI INCL CAD: CPT

## 2025-05-01 PROCEDURE — 4410556 CT-CARDIAC SCORING (SELF PAY ONLY)

## 2025-05-06 ENCOUNTER — APPOINTMENT (OUTPATIENT)
Dept: URBAN - METROPOLITAN AREA CLINIC 6 | Facility: CLINIC | Age: 65
Setting detail: DERMATOLOGY
End: 2025-05-06

## 2025-05-06 DIAGNOSIS — L82.1 OTHER SEBORRHEIC KERATOSIS: ICD-10-CM

## 2025-05-06 DIAGNOSIS — D18.0 HEMANGIOMA: ICD-10-CM

## 2025-05-06 DIAGNOSIS — Z71.89 OTHER SPECIFIED COUNSELING: ICD-10-CM

## 2025-05-06 DIAGNOSIS — D22 MELANOCYTIC NEVI: ICD-10-CM

## 2025-05-06 DIAGNOSIS — L81.3 CAFÉ AU LAIT SPOTS: ICD-10-CM

## 2025-05-06 DIAGNOSIS — L57.0 ACTINIC KERATOSIS: ICD-10-CM

## 2025-05-06 DIAGNOSIS — L81.4 OTHER MELANIN HYPERPIGMENTATION: ICD-10-CM

## 2025-05-06 PROBLEM — D18.01 HEMANGIOMA OF SKIN AND SUBCUTANEOUS TISSUE: Status: ACTIVE | Noted: 2025-05-06

## 2025-05-06 PROBLEM — D22.62 MELANOCYTIC NEVI OF LEFT UPPER LIMB, INCLUDING SHOULDER: Status: ACTIVE | Noted: 2025-05-06

## 2025-05-06 PROBLEM — D22.61 MELANOCYTIC NEVI OF RIGHT UPPER LIMB, INCLUDING SHOULDER: Status: ACTIVE | Noted: 2025-05-06

## 2025-05-06 PROBLEM — D22.72 MELANOCYTIC NEVI OF LEFT LOWER LIMB, INCLUDING HIP: Status: ACTIVE | Noted: 2025-05-06

## 2025-05-06 PROBLEM — D22.71 MELANOCYTIC NEVI OF RIGHT LOWER LIMB, INCLUDING HIP: Status: ACTIVE | Noted: 2025-05-06

## 2025-05-06 PROBLEM — D22.5 MELANOCYTIC NEVI OF TRUNK: Status: ACTIVE | Noted: 2025-05-06

## 2025-05-06 PROCEDURE — 17000 DESTRUCT PREMALG LESION: CPT

## 2025-05-06 PROCEDURE — ? COUNSELING

## 2025-05-06 PROCEDURE — 99203 OFFICE O/P NEW LOW 30 MIN: CPT | Mod: 25

## 2025-05-06 PROCEDURE — 17003 DESTRUCT PREMALG LES 2-14: CPT

## 2025-05-06 PROCEDURE — ? LIQUID NITROGEN

## 2025-05-06 ASSESSMENT — LOCATION DETAILED DESCRIPTION DERM
LOCATION DETAILED: LEFT PROXIMAL PRETIBIAL REGION
LOCATION DETAILED: PERIUMBILICAL SKIN
LOCATION DETAILED: RIGHT ANTECUBITAL SKIN
LOCATION DETAILED: RIGHT ANTERIOR DISTAL UPPER ARM
LOCATION DETAILED: RIGHT VENTRAL PROXIMAL FOREARM
LOCATION DETAILED: RIGHT ANTERIOR PROXIMAL UPPER ARM
LOCATION DETAILED: LEFT ANTERIOR DISTAL THIGH
LOCATION DETAILED: LEFT ANTERIOR DISTAL UPPER ARM
LOCATION DETAILED: RIGHT KNEE
LOCATION DETAILED: LEFT ANTERIOR PROXIMAL UPPER ARM
LOCATION DETAILED: LEFT SUPERIOR MEDIAL FOREHEAD
LOCATION DETAILED: LEFT DISTAL PRETIBIAL REGION
LOCATION DETAILED: LEFT KNEE
LOCATION DETAILED: RIGHT PROXIMAL PRETIBIAL REGION
LOCATION DETAILED: LOWER STERNUM
LOCATION DETAILED: EPIGASTRIC SKIN
LOCATION DETAILED: LEFT VENTRAL PROXIMAL FOREARM
LOCATION DETAILED: RIGHT ANTERIOR DISTAL THIGH
LOCATION DETAILED: RIGHT SUPERIOR MEDIAL FOREHEAD

## 2025-05-06 ASSESSMENT — LOCATION SIMPLE DESCRIPTION DERM
LOCATION SIMPLE: LEFT KNEE
LOCATION SIMPLE: LEFT THIGH
LOCATION SIMPLE: CHEST
LOCATION SIMPLE: RIGHT PRETIBIAL REGION
LOCATION SIMPLE: LEFT FOREARM
LOCATION SIMPLE: LEFT FOREHEAD
LOCATION SIMPLE: RIGHT UPPER ARM
LOCATION SIMPLE: RIGHT FOREHEAD
LOCATION SIMPLE: LEFT PRETIBIAL REGION
LOCATION SIMPLE: ABDOMEN
LOCATION SIMPLE: RIGHT THIGH
LOCATION SIMPLE: RIGHT FOREARM
LOCATION SIMPLE: RIGHT KNEE
LOCATION SIMPLE: LEFT UPPER ARM

## 2025-05-06 ASSESSMENT — LOCATION ZONE DERM
LOCATION ZONE: LEG
LOCATION ZONE: FACE
LOCATION ZONE: ARM
LOCATION ZONE: TRUNK

## 2025-05-06 NOTE — PROCEDURE: LIQUID NITROGEN
Number Of Freeze-Thaw Cycles: 3 freeze-thaw cycles
Render Note In Bullet Format When Appropriate: No
Application Tool (Optional): Liquid Nitrogen Sprayer
Consent: The patient's consent was obtained including but not limited to risks of crusting, scabbing, blistering, scarring, darker or lighter pigmentary change, recurrence, incomplete removal and infection.
Post-Care Instructions: I reviewed with the patient in detail post-care instructions. Patient is to wear sunprotection, and avoid picking at any of the treated lesions. Pt may apply Vaseline to crusted or scabbing areas.
Detail Level: Detailed
Duration Of Freeze Thaw-Cycle (Seconds): 10
Show Aperture Variable?: Yes

## 2025-05-08 ENCOUNTER — RESULTS FOLLOW-UP (OUTPATIENT)
Dept: MEDICAL GROUP | Facility: IMAGING CENTER | Age: 65
End: 2025-05-08

## 2025-05-08 ENCOUNTER — HOSPITAL ENCOUNTER (OUTPATIENT)
Dept: LAB | Facility: MEDICAL CENTER | Age: 65
End: 2025-05-08
Attending: FAMILY MEDICINE
Payer: COMMERCIAL

## 2025-05-08 DIAGNOSIS — Z11.4 SCREENING FOR HIV (HUMAN IMMUNODEFICIENCY VIRUS): ICD-10-CM

## 2025-05-08 DIAGNOSIS — Z13.29 SCREENING FOR ENDOCRINE DISORDER: ICD-10-CM

## 2025-05-08 DIAGNOSIS — R73.03 PREDIABETES: ICD-10-CM

## 2025-05-08 DIAGNOSIS — I10 PRIMARY HYPERTENSION: ICD-10-CM

## 2025-05-08 DIAGNOSIS — Z13.0 SCREENING FOR DEFICIENCY ANEMIA: ICD-10-CM

## 2025-05-08 DIAGNOSIS — E78.5 HYPERLIPIDEMIA, UNSPECIFIED HYPERLIPIDEMIA TYPE: ICD-10-CM

## 2025-05-08 DIAGNOSIS — Z13.21 ENCOUNTER FOR VITAMIN DEFICIENCY SCREENING: ICD-10-CM

## 2025-05-08 LAB
25(OH)D3 SERPL-MCNC: 23 NG/ML (ref 30–100)
ALBUMIN SERPL BCP-MCNC: 4.2 G/DL (ref 3.2–4.9)
ALBUMIN/GLOB SERPL: 1.5 G/DL
ALP SERPL-CCNC: 53 U/L (ref 30–99)
ALT SERPL-CCNC: 26 U/L (ref 2–50)
ANION GAP SERPL CALC-SCNC: 6 MMOL/L (ref 7–16)
AST SERPL-CCNC: 22 U/L (ref 12–45)
BASOPHILS # BLD AUTO: 0.5 % (ref 0–1.8)
BASOPHILS # BLD: 0.03 K/UL (ref 0–0.12)
BILIRUB SERPL-MCNC: 0.3 MG/DL (ref 0.1–1.5)
BUN SERPL-MCNC: 15 MG/DL (ref 8–22)
CALCIUM ALBUM COR SERPL-MCNC: 9.2 MG/DL (ref 8.5–10.5)
CALCIUM SERPL-MCNC: 9.4 MG/DL (ref 8.5–10.5)
CHLORIDE SERPL-SCNC: 105 MMOL/L (ref 96–112)
CHOLEST SERPL-MCNC: 222 MG/DL (ref 100–199)
CO2 SERPL-SCNC: 29 MMOL/L (ref 20–33)
CREAT SERPL-MCNC: 0.89 MG/DL (ref 0.5–1.4)
EOSINOPHIL # BLD AUTO: 0.18 K/UL (ref 0–0.51)
EOSINOPHIL NFR BLD: 3.2 % (ref 0–6.9)
ERYTHROCYTE [DISTWIDTH] IN BLOOD BY AUTOMATED COUNT: 42.4 FL (ref 35.9–50)
EST. AVERAGE GLUCOSE BLD GHB EST-MCNC: 120 MG/DL
FASTING STATUS PATIENT QL REPORTED: NORMAL
GFR SERPLBLD CREATININE-BSD FMLA CKD-EPI: 72 ML/MIN/1.73 M 2
GLOBULIN SER CALC-MCNC: 2.8 G/DL (ref 1.9–3.5)
GLUCOSE SERPL-MCNC: 106 MG/DL (ref 65–99)
HBA1C MFR BLD: 5.8 % (ref 4–5.6)
HCT VFR BLD AUTO: 46.2 % (ref 37–47)
HDLC SERPL-MCNC: 54 MG/DL
HGB BLD-MCNC: 14.8 G/DL (ref 12–16)
HIV 1+2 AB+HIV1 P24 AG SERPL QL IA: NORMAL
IMM GRANULOCYTES # BLD AUTO: 0.01 K/UL (ref 0–0.11)
IMM GRANULOCYTES NFR BLD AUTO: 0.2 % (ref 0–0.9)
LDLC SERPL CALC-MCNC: 152 MG/DL
LYMPHOCYTES # BLD AUTO: 1.82 K/UL (ref 1–4.8)
LYMPHOCYTES NFR BLD: 32.4 % (ref 22–41)
MCH RBC QN AUTO: 30.3 PG (ref 27–33)
MCHC RBC AUTO-ENTMCNC: 32 G/DL (ref 32.2–35.5)
MCV RBC AUTO: 94.7 FL (ref 81.4–97.8)
MONOCYTES # BLD AUTO: 0.49 K/UL (ref 0–0.85)
MONOCYTES NFR BLD AUTO: 8.7 % (ref 0–13.4)
NEUTROPHILS # BLD AUTO: 3.08 K/UL (ref 1.82–7.42)
NEUTROPHILS NFR BLD: 55 % (ref 44–72)
NRBC # BLD AUTO: 0 K/UL
NRBC BLD-RTO: 0 /100 WBC (ref 0–0.2)
PLATELET # BLD AUTO: 240 K/UL (ref 164–446)
PMV BLD AUTO: 10.8 FL (ref 9–12.9)
POTASSIUM SERPL-SCNC: 5 MMOL/L (ref 3.6–5.5)
PROT SERPL-MCNC: 7 G/DL (ref 6–8.2)
RBC # BLD AUTO: 4.88 M/UL (ref 4.2–5.4)
SODIUM SERPL-SCNC: 140 MMOL/L (ref 135–145)
TRIGL SERPL-MCNC: 81 MG/DL (ref 0–149)
TSH SERPL DL<=0.005 MIU/L-ACNC: 2.26 UIU/ML (ref 0.38–5.33)
WBC # BLD AUTO: 5.6 K/UL (ref 4.8–10.8)

## 2025-05-08 PROCEDURE — 36415 COLL VENOUS BLD VENIPUNCTURE: CPT

## 2025-05-08 PROCEDURE — 83036 HEMOGLOBIN GLYCOSYLATED A1C: CPT

## 2025-05-08 PROCEDURE — 80053 COMPREHEN METABOLIC PANEL: CPT

## 2025-05-08 PROCEDURE — 87389 HIV-1 AG W/HIV-1&-2 AB AG IA: CPT

## 2025-05-08 PROCEDURE — 82306 VITAMIN D 25 HYDROXY: CPT

## 2025-05-08 PROCEDURE — 85025 COMPLETE CBC W/AUTO DIFF WBC: CPT

## 2025-05-08 PROCEDURE — 80061 LIPID PANEL: CPT

## 2025-05-08 PROCEDURE — 84443 ASSAY THYROID STIM HORMONE: CPT

## 2025-06-11 ENCOUNTER — OFFICE VISIT (OUTPATIENT)
Dept: MEDICAL GROUP | Facility: IMAGING CENTER | Age: 65
End: 2025-06-11
Payer: COMMERCIAL

## 2025-06-11 VITALS
DIASTOLIC BLOOD PRESSURE: 60 MMHG | SYSTOLIC BLOOD PRESSURE: 104 MMHG | OXYGEN SATURATION: 97 % | TEMPERATURE: 97.6 F | WEIGHT: 207 LBS | HEART RATE: 69 BPM | BODY MASS INDEX: 33.27 KG/M2 | HEIGHT: 66 IN | RESPIRATION RATE: 16 BRPM

## 2025-06-11 DIAGNOSIS — R73.03 PREDIABETES: ICD-10-CM

## 2025-06-11 DIAGNOSIS — I10 PRIMARY HYPERTENSION: Chronic | ICD-10-CM

## 2025-06-11 DIAGNOSIS — Z87.898 HISTORY OF ABDOMINAL PAIN: ICD-10-CM

## 2025-06-11 DIAGNOSIS — E78.5 HYPERLIPIDEMIA, UNSPECIFIED HYPERLIPIDEMIA TYPE: Chronic | ICD-10-CM

## 2025-06-11 DIAGNOSIS — E55.9 VITAMIN D INSUFFICIENCY: ICD-10-CM

## 2025-06-11 PROCEDURE — 3078F DIAST BP <80 MM HG: CPT | Performed by: FAMILY MEDICINE

## 2025-06-11 PROCEDURE — 99213 OFFICE O/P EST LOW 20 MIN: CPT | Performed by: FAMILY MEDICINE

## 2025-06-11 PROCEDURE — 3074F SYST BP LT 130 MM HG: CPT | Performed by: FAMILY MEDICINE

## 2025-06-11 ASSESSMENT — FIBROSIS 4 INDEX: FIB4 SCORE: 1.13

## 2025-06-11 NOTE — PROGRESS NOTES
"  SUBJECTIVE:    Chief Complaint   Patient presents with    Lab Results     5/8 labs       HPI:     Gabriela Mosquera is a 64 y.o. female here for lab review.  A1c 5.8%, glucose 106  Total cholesterol 222, - butter occasionally in her diet.  Mostly cooks with olive oil.  Vitamin D 23- no supplement at this time.   Hypertension-stable on amlodipine 5 mg daily.    Previously had episode of symptoms-pain RLQ - 24 hours lasted then resolved.   Had something similar a few years ago.   Not related to BM. Had intercourse night, night before.   Not constant. No fever.  No GI symptoms.  No other symptoms.     Due for gyn exam.  Patient will plan to schedule for future.    ROS:  No recent fevers or chills. No nausea or vomiting. No diarrhea. No chest pains or shortness of breath. No lower extremity edema.    Medications Ordered Prior to Encounter[1]    Allergies[2]    Patient Active Problem List    Diagnosis Date Noted    Family history of diabetes mellitus 04/11/2025    Severe obesity (HCC) 10/28/2024    Tingling 05/04/2023    Pelvic pain 03/10/2021    Allergic rhinitis 02/10/2021    Primary hypertension 12/23/2020    Prediabetes 12/23/2020    Hyperlipidemia 12/23/2020    BPPV (benign paroxysmal positional vertigo), unspecified laterality 12/09/2020    Tremor 12/09/2020    Left foot pain 12/09/2020    Body mass index (BMI) of 36.0-36.9 in adult 12/09/2020    Herniation of rectum into vagina 06/17/2016       Past Medical History[3]      OBJECTIVE:   /60 (BP Location: Left arm, Patient Position: Sitting, BP Cuff Size: Adult long)   Pulse 69   Temp 36.4 °C (97.6 °F) (Temporal)   Resp 16   Ht 1.676 m (5' 6\")   Wt 93.9 kg (207 lb)   SpO2 97%   BMI 33.41 kg/m²   General: Well-developed well-nourished female, no acute distress  Neck: supple, no lymphadenopathy- cervical or supraclavicular, no thyromegaly  Cardiovascular: regular rate and rhythm, no murmurs, gallops, rubs  Lungs: clear to auscultation " bilaterally, no wheezes, crackles, or rhonchi  Abdomen: +bowel sounds, soft, nontender, nondistended, no rebound, no guarding, no hepatosplenomegaly  Extremities: no cyanosis, clubbing, edema  Skin: Warm and dry  Psych: appropriate mood and affect     Latest Reference Range & Units 05/08/25 07:51   WBC 4.8 - 10.8 K/uL 5.6   RBC 4.20 - 5.40 M/uL 4.88   Hemoglobin 12.0 - 16.0 g/dL 14.8   Hematocrit 37.0 - 47.0 % 46.2   MCV 81.4 - 97.8 fL 94.7   MCH 27.0 - 33.0 pg 30.3   MCHC 32.2 - 35.5 g/dL 32.0 (L)   RDW 35.9 - 50.0 fL 42.4   Platelet Count 164 - 446 K/uL 240   MPV 9.0 - 12.9 fL 10.8   Neutrophils-Polys 44.00 - 72.00 % 55.00   Neutrophils (Absolute) 1.82 - 7.42 K/uL 3.08   Lymphocytes 22.00 - 41.00 % 32.40   Lymphs (Absolute) 1.00 - 4.80 K/uL 1.82   Monocytes 0.00 - 13.40 % 8.70   Monos (Absolute) 0.00 - 0.85 K/uL 0.49   Eosinophils 0.00 - 6.90 % 3.20   Eos (Absolute) 0.00 - 0.51 K/uL 0.18   Basophils 0.00 - 1.80 % 0.50   Baso (Absolute) 0.00 - 0.12 K/uL 0.03   Immature Granulocytes 0.00 - 0.90 % 0.20   Immature Granulocytes (abs) 0.00 - 0.11 K/uL 0.01   Nucleated RBC 0.00 - 0.20 /100 WBC 0.00   NRBC (Absolute) K/uL 0.00   Sodium 135 - 145 mmol/L 140   Potassium 3.6 - 5.5 mmol/L 5.0   Chloride 96 - 112 mmol/L 105   Co2 20 - 33 mmol/L 29   Anion Gap 7.0 - 16.0  6.0 (L)   Glucose 65 - 99 mg/dL 106 (H)   Bun 8 - 22 mg/dL 15   Creatinine 0.50 - 1.40 mg/dL 0.89   GFR (CKD-EPI) >60 mL/min/1.73 m 2 72   Calcium 8.5 - 10.5 mg/dL 9.4   Correct Calcium 8.5 - 10.5 mg/dL 9.2   AST(SGOT) 12 - 45 U/L 22   ALT(SGPT) 2 - 50 U/L 26   Alkaline Phosphatase 30 - 99 U/L 53   Total Bilirubin 0.1 - 1.5 mg/dL 0.3   Albumin 3.2 - 4.9 g/dL 4.2   Total Protein 6.0 - 8.2 g/dL 7.0   Globulin 1.9 - 3.5 g/dL 2.8   A-G Ratio g/dL 1.5   Glycohemoglobin 4.0 - 5.6 % 5.8 (H)   Estim. Avg Glu mg/dL 120   Fasting Status  Fasting   Cholesterol,Tot 100 - 199 mg/dL 222 (H)   Triglycerides 0 - 149 mg/dL 81   HDL >=40 mg/dL 54   LDL <100 mg/dL 152 (H)    25-Hydroxy   Vitamin D 25 30 - 100 ng/mL 23 (L)   TSH 0.380 - 5.330 uIU/mL 2.260   HIV Ag/Ab Combo Assay Non Reactive  Non-Reactive   (L): Data is abnormally low  (H): Data is abnormally high    Narrative & Impression     5/1/2025 3:13 PM     HISTORY/REASON FOR EXAM:  Hyperlipidemia     The study is not performed on an asymptomatic, low CHD risk patient for initial detection and risk assessment.     TECHNIQUE/EXAM DESCRIPTION:  Multi-detector, helical imaging of the heart was performed with ECG gating and suspended respiration. Postprocessing was performed on a three-dimensional computer workstation. Diastolic phase images were evaluated for coronary artery calcification, and a   quantitative assessment provided.     Low dose optimization technique was utilized for this CT exam including automated exposure control and adjustment of the mA and/or kV according to patient size.     COMPARISON: None.     FINDINGS:     Coronary calcification:  LMA - 0.0  LCX - 0.0  LAD - 0.0  RCA - 0.0  PDA - 0.0     Total Calcium Score: 0.0     Percentile: Calcium score is below the 50th percentile for the patient's age and sex.     Other findings:  Heart: Normal size.  Lungs: Clear.  Mediastinum: Normal.  Upper abdomen: Normal.     IMPRESSION:     Coronary Calcium Score of 0    ASSESSMENT/PLAN:    64 y.o.female       1. Prediabetes   Recommend healthy diet and routine exercise.  Monitor in future. Comp Metabolic Panel      2. Hyperlipidemia, unspecified hyperlipidemia type-CT cardiac score 0.  Reviewed optimizing lipid levels.  Recommend low-cholesterol, high-fiber diet and routine exercise.  Repeat labs in 3 months. Lipid Profile    Comp Metabolic Panel      3. Vitamin D insufficiency   Recommend vitamin D 3190-4682 IU daily.  Monitor. VITAMIN D,25 HYDROXY (DEFICIENCY)      4. Primary hypertension-stable.  Continue amlodipine 5 mg daily.       5. History of abdominal pain -right lower quadrant noted after intercourse lasting about  24-hours and then resolving spontaneously.  Unclear etiology.  Recommend complete routine gynecological visit.  If recurrent symptoms recommend consider ER for further evaluation.         Return in about 3 months (around 9/11/2025).    This medical record contains text that has been entered with the assistance of computer voice recognition and dictation software.  Therefore, it may contain unintended errors in text, spelling, punctuation, or grammar.                                 [1]   Current Outpatient Medications on File Prior to Visit   Medication Sig Dispense Refill    amLODIPine (NORVASC) 5 MG Tab Take 1 Tablet by mouth every day. 90 Tablet 0     No current facility-administered medications on file prior to visit.   [2]   Allergies  Allergen Reactions    Seasonal    [3]   Past Medical History:  Diagnosis Date    Acute pain of left shoulder 03/24/2022    Allergy     Bell's palsy     Hypertension

## 2025-07-15 ENCOUNTER — APPOINTMENT (OUTPATIENT)
Dept: URBAN - METROPOLITAN AREA CLINIC 6 | Facility: CLINIC | Age: 65
Setting detail: DERMATOLOGY
End: 2025-07-15

## 2025-07-15 DIAGNOSIS — L82.1 OTHER SEBORRHEIC KERATOSIS: ICD-10-CM

## 2025-07-15 DIAGNOSIS — L82.0 INFLAMED SEBORRHEIC KERATOSIS: ICD-10-CM

## 2025-07-15 PROCEDURE — ? COUNSELING

## 2025-07-15 PROCEDURE — ? LIQUID NITROGEN

## 2025-07-15 ASSESSMENT — LOCATION ZONE DERM: LOCATION ZONE: TRUNK

## 2025-07-15 ASSESSMENT — LOCATION DETAILED DESCRIPTION DERM: LOCATION DETAILED: RIGHT SUPERIOR UPPER BACK

## 2025-07-15 ASSESSMENT — LOCATION SIMPLE DESCRIPTION DERM: LOCATION SIMPLE: RIGHT UPPER BACK
